# Patient Record
Sex: FEMALE | Race: WHITE | NOT HISPANIC OR LATINO | Employment: UNEMPLOYED | URBAN - METROPOLITAN AREA
[De-identification: names, ages, dates, MRNs, and addresses within clinical notes are randomized per-mention and may not be internally consistent; named-entity substitution may affect disease eponyms.]

---

## 2023-01-01 ENCOUNTER — HOME CARE VISIT (OUTPATIENT)
Dept: HOSPICE | Facility: HOSPICE | Age: 63
End: 2023-01-01
Payer: MEDICARE

## 2023-01-01 ENCOUNTER — PHARMACY VISIT (OUTPATIENT)
Dept: PHARMACY | Facility: MEDICAL CENTER | Age: 63
End: 2023-01-01
Payer: COMMERCIAL

## 2023-01-01 ENCOUNTER — OFFICE VISIT (OUTPATIENT)
Dept: URGENT CARE | Facility: PHYSICIAN GROUP | Age: 63
End: 2023-01-01
Payer: MEDICARE

## 2023-01-01 ENCOUNTER — HOSPITAL ENCOUNTER (INPATIENT)
Facility: MEDICAL CENTER | Age: 63
LOS: 5 days | DRG: 640 | End: 2023-11-29
Attending: EMERGENCY MEDICINE | Admitting: FAMILY MEDICINE
Payer: MEDICARE

## 2023-01-01 ENCOUNTER — APPOINTMENT (OUTPATIENT)
Dept: RADIOLOGY | Facility: MEDICAL CENTER | Age: 63
DRG: 640 | End: 2023-01-01
Attending: EMERGENCY MEDICINE
Payer: MEDICARE

## 2023-01-01 ENCOUNTER — PHARMACY VISIT (OUTPATIENT)
Dept: PHARMACY | Facility: MEDICAL CENTER | Age: 63
End: 2023-01-01

## 2023-01-01 ENCOUNTER — HOSPICE ADMISSION (OUTPATIENT)
Dept: HOSPICE | Facility: HOSPICE | Age: 63
End: 2023-01-01
Payer: MEDICARE

## 2023-01-01 ENCOUNTER — HOSPITAL ENCOUNTER (OUTPATIENT)
Dept: RADIOLOGY | Facility: MEDICAL CENTER | Age: 63
End: 2023-11-20
Attending: NURSE PRACTITIONER
Payer: MEDICARE

## 2023-01-01 VITALS — HEART RATE: 100 BPM | SYSTOLIC BLOOD PRESSURE: 140 MMHG | RESPIRATION RATE: 22 BRPM | DIASTOLIC BLOOD PRESSURE: 84 MMHG

## 2023-01-01 VITALS — RESPIRATION RATE: 18 BRPM | HEART RATE: 80 BPM

## 2023-01-01 VITALS
RESPIRATION RATE: 18 BRPM | TEMPERATURE: 97.5 F | DIASTOLIC BLOOD PRESSURE: 74 MMHG | HEART RATE: 94 BPM | SYSTOLIC BLOOD PRESSURE: 132 MMHG | WEIGHT: 157.85 LBS | HEIGHT: 64 IN | BODY MASS INDEX: 26.95 KG/M2 | OXYGEN SATURATION: 95 %

## 2023-01-01 VITALS
BODY MASS INDEX: 27.66 KG/M2 | TEMPERATURE: 96.8 F | HEIGHT: 64 IN | OXYGEN SATURATION: 95 % | SYSTOLIC BLOOD PRESSURE: 132 MMHG | DIASTOLIC BLOOD PRESSURE: 82 MMHG | RESPIRATION RATE: 13 BRPM | HEART RATE: 94 BPM | WEIGHT: 162 LBS

## 2023-01-01 VITALS
HEIGHT: 63 IN | DIASTOLIC BLOOD PRESSURE: 80 MMHG | SYSTOLIC BLOOD PRESSURE: 134 MMHG | RESPIRATION RATE: 18 BRPM | HEART RATE: 106 BPM | TEMPERATURE: 97.7 F | OXYGEN SATURATION: 94 % | WEIGHT: 162 LBS | BODY MASS INDEX: 28.7 KG/M2

## 2023-01-01 VITALS — HEART RATE: 88 BPM | RESPIRATION RATE: 20 BRPM

## 2023-01-01 VITALS — HEART RATE: 72 BPM | RESPIRATION RATE: 18 BRPM

## 2023-01-01 VITALS — HEART RATE: 74 BPM | RESPIRATION RATE: 16 BRPM

## 2023-01-01 VITALS — RESPIRATION RATE: 18 BRPM | HEART RATE: 78 BPM

## 2023-01-01 VITALS — HEART RATE: 72 BPM | RESPIRATION RATE: 16 BRPM

## 2023-01-01 VITALS — RESPIRATION RATE: 12 BRPM | HEART RATE: 48 BPM

## 2023-01-01 DIAGNOSIS — M25.50 ARTHRALGIA, UNSPECIFIED JOINT: ICD-10-CM

## 2023-01-01 DIAGNOSIS — M51.36 DDD (DEGENERATIVE DISC DISEASE), LUMBAR: ICD-10-CM

## 2023-01-01 DIAGNOSIS — M54.50 ACUTE RIGHT-SIDED LOW BACK PAIN WITHOUT SCIATICA: ICD-10-CM

## 2023-01-01 DIAGNOSIS — G89.3 CANCER ASSOCIATED PAIN: ICD-10-CM

## 2023-01-01 DIAGNOSIS — M25.552 LEFT HIP PAIN: ICD-10-CM

## 2023-01-01 DIAGNOSIS — C79.9 METASTATIC MALIGNANT NEOPLASM, UNSPECIFIED SITE (HCC): ICD-10-CM

## 2023-01-01 DIAGNOSIS — R00.0 TACHYCARDIA: ICD-10-CM

## 2023-01-01 DIAGNOSIS — M54.50 ACUTE BILATERAL LOW BACK PAIN WITHOUT SCIATICA: ICD-10-CM

## 2023-01-01 DIAGNOSIS — C79.9 METASTATIC MALIGNANT NEOPLASM, UNSPECIFIED SITE (HCC): Primary | ICD-10-CM

## 2023-01-01 DIAGNOSIS — E83.52 HYPERCALCEMIA: ICD-10-CM

## 2023-01-01 DIAGNOSIS — Z51.5 HOSPICE CARE: ICD-10-CM

## 2023-01-01 DIAGNOSIS — M54.50 ACUTE LOW BACK PAIN WITHOUT SCIATICA, UNSPECIFIED BACK PAIN LATERALITY: ICD-10-CM

## 2023-01-01 LAB
ALBUMIN SERPL BCP-MCNC: 3.2 G/DL (ref 3.2–4.9)
ALBUMIN SERPL BCP-MCNC: 3.6 G/DL (ref 3.2–4.9)
ALBUMIN SERPL BCP-MCNC: 4.1 G/DL (ref 3.2–4.9)
ALBUMIN SERPL BCP-MCNC: 4.7 G/DL (ref 3.2–4.9)
ALBUMIN/GLOB SERPL: 1.3 G/DL
ALBUMIN/GLOB SERPL: 1.3 G/DL
ALBUMIN/GLOB SERPL: 1.5 G/DL
ALBUMIN/GLOB SERPL: 1.5 G/DL
ALP SERPL-CCNC: 108 U/L (ref 30–99)
ALP SERPL-CCNC: 119 U/L (ref 30–99)
ALP SERPL-CCNC: 131 U/L (ref 30–99)
ALP SERPL-CCNC: 98 U/L (ref 30–99)
ALT SERPL-CCNC: 10 U/L (ref 2–50)
ALT SERPL-CCNC: 10 U/L (ref 2–50)
ALT SERPL-CCNC: 11 U/L (ref 2–50)
ALT SERPL-CCNC: 11 U/L (ref 2–50)
ANION GAP SERPL CALC-SCNC: 10 MMOL/L (ref 7–16)
ANION GAP SERPL CALC-SCNC: 11 MMOL/L (ref 7–16)
ANION GAP SERPL CALC-SCNC: 13 MMOL/L (ref 7–16)
ANION GAP SERPL CALC-SCNC: 6 MMOL/L (ref 7–16)
AST SERPL-CCNC: 15 U/L (ref 12–45)
AST SERPL-CCNC: 17 U/L (ref 12–45)
AST SERPL-CCNC: 17 U/L (ref 12–45)
AST SERPL-CCNC: 18 U/L (ref 12–45)
BACTERIA BLD CULT: NORMAL
BASOPHILS # BLD AUTO: 0.1 % (ref 0–1.8)
BASOPHILS # BLD AUTO: 0.1 % (ref 0–1.8)
BASOPHILS # BLD AUTO: 0.2 % (ref 0–1.8)
BASOPHILS # BLD AUTO: 0.3 % (ref 0–1.8)
BASOPHILS # BLD: 0.01 K/UL (ref 0–0.12)
BASOPHILS # BLD: 0.02 K/UL (ref 0–0.12)
BASOPHILS # BLD: 0.02 K/UL (ref 0–0.12)
BASOPHILS # BLD: 0.04 K/UL (ref 0–0.12)
BILIRUB SERPL-MCNC: 0.3 MG/DL (ref 0.1–1.5)
BUN SERPL-MCNC: 14 MG/DL (ref 8–22)
BUN SERPL-MCNC: 17 MG/DL (ref 8–22)
BUN SERPL-MCNC: 19 MG/DL (ref 8–22)
BUN SERPL-MCNC: 9 MG/DL (ref 8–22)
CA-I SERPL-SCNC: 1.6 MMOL/L (ref 1.1–1.3)
CALCIUM ALBUM COR SERPL-MCNC: 10.3 MG/DL (ref 8.5–10.5)
CALCIUM ALBUM COR SERPL-MCNC: 11.4 MG/DL (ref 8.5–10.5)
CALCIUM ALBUM COR SERPL-MCNC: 12 MG/DL (ref 8.5–10.5)
CALCIUM ALBUM COR SERPL-MCNC: 12.5 MG/DL (ref 8.5–10.5)
CALCIUM SERPL-MCNC: 11.1 MG/DL (ref 8.5–10.5)
CALCIUM SERPL-MCNC: 12.1 MG/DL (ref 8.5–10.5)
CALCIUM SERPL-MCNC: 13.1 MG/DL (ref 8.5–10.5)
CALCIUM SERPL-MCNC: 9.7 MG/DL (ref 8.5–10.5)
CHLORIDE SERPL-SCNC: 100 MMOL/L (ref 96–112)
CHLORIDE SERPL-SCNC: 100 MMOL/L (ref 96–112)
CHLORIDE SERPL-SCNC: 96 MMOL/L (ref 96–112)
CHLORIDE SERPL-SCNC: 97 MMOL/L (ref 96–112)
CO2 SERPL-SCNC: 23 MMOL/L (ref 20–33)
CO2 SERPL-SCNC: 25 MMOL/L (ref 20–33)
CO2 SERPL-SCNC: 27 MMOL/L (ref 20–33)
CO2 SERPL-SCNC: 27 MMOL/L (ref 20–33)
CREAT SERPL-MCNC: 0.66 MG/DL (ref 0.5–1.4)
CREAT SERPL-MCNC: 0.85 MG/DL (ref 0.5–1.4)
CREAT SERPL-MCNC: 0.86 MG/DL (ref 0.5–1.4)
CREAT SERPL-MCNC: 0.92 MG/DL (ref 0.5–1.4)
EKG IMPRESSION: NORMAL
EOSINOPHIL # BLD AUTO: 0 K/UL (ref 0–0.51)
EOSINOPHIL # BLD AUTO: 0.04 K/UL (ref 0–0.51)
EOSINOPHIL # BLD AUTO: 0.17 K/UL (ref 0–0.51)
EOSINOPHIL # BLD AUTO: 0.23 K/UL (ref 0–0.51)
EOSINOPHIL NFR BLD: 0 % (ref 0–6.9)
EOSINOPHIL NFR BLD: 0.3 % (ref 0–6.9)
EOSINOPHIL NFR BLD: 1.5 % (ref 0–6.9)
EOSINOPHIL NFR BLD: 2.5 % (ref 0–6.9)
ERYTHROCYTE [DISTWIDTH] IN BLOOD BY AUTOMATED COUNT: 43.9 FL (ref 35.9–50)
ERYTHROCYTE [DISTWIDTH] IN BLOOD BY AUTOMATED COUNT: 44.6 FL (ref 35.9–50)
ERYTHROCYTE [DISTWIDTH] IN BLOOD BY AUTOMATED COUNT: 45.2 FL (ref 35.9–50)
ERYTHROCYTE [DISTWIDTH] IN BLOOD BY AUTOMATED COUNT: 46 FL (ref 35.9–50)
EST. AVERAGE GLUCOSE BLD GHB EST-MCNC: 131 MG/DL
GFR SERPLBLD CREATININE-BSD FMLA CKD-EPI: 69 ML/MIN/1.73 M 2
GFR SERPLBLD CREATININE-BSD FMLA CKD-EPI: 75 ML/MIN/1.73 M 2
GFR SERPLBLD CREATININE-BSD FMLA CKD-EPI: 76 ML/MIN/1.73 M 2
GFR SERPLBLD CREATININE-BSD FMLA CKD-EPI: 97 ML/MIN/1.73 M 2
GLOBULIN SER CALC-MCNC: 2.5 G/DL (ref 1.9–3.5)
GLOBULIN SER CALC-MCNC: 2.7 G/DL (ref 1.9–3.5)
GLOBULIN SER CALC-MCNC: 2.8 G/DL (ref 1.9–3.5)
GLOBULIN SER CALC-MCNC: 3.2 G/DL (ref 1.9–3.5)
GLUCOSE SERPL-MCNC: 112 MG/DL (ref 65–99)
GLUCOSE SERPL-MCNC: 119 MG/DL (ref 65–99)
GLUCOSE SERPL-MCNC: 125 MG/DL (ref 65–99)
GLUCOSE SERPL-MCNC: 126 MG/DL (ref 65–99)
HBA1C MFR BLD: 6.2 % (ref 4–5.6)
HCT VFR BLD AUTO: 37.6 % (ref 37–47)
HCT VFR BLD AUTO: 40.2 % (ref 37–47)
HCT VFR BLD AUTO: 43.7 % (ref 37–47)
HCT VFR BLD AUTO: 44.5 % (ref 37–47)
HGB BLD-MCNC: 12.3 G/DL (ref 12–16)
HGB BLD-MCNC: 12.8 G/DL (ref 12–16)
HGB BLD-MCNC: 14.6 G/DL (ref 12–16)
HGB BLD-MCNC: 14.8 G/DL (ref 12–16)
IMM GRANULOCYTES # BLD AUTO: 0.08 K/UL (ref 0–0.11)
IMM GRANULOCYTES # BLD AUTO: 0.1 K/UL (ref 0–0.11)
IMM GRANULOCYTES # BLD AUTO: 0.14 K/UL (ref 0–0.11)
IMM GRANULOCYTES # BLD AUTO: 0.17 K/UL (ref 0–0.11)
IMM GRANULOCYTES NFR BLD AUTO: 0.9 % (ref 0–0.9)
IMM GRANULOCYTES NFR BLD AUTO: 1.1 % (ref 0–0.9)
LACTATE SERPL-SCNC: 2 MMOL/L (ref 0.5–2)
LACTATE SERPL-SCNC: 3.3 MMOL/L (ref 0.5–2)
LYMPHOCYTES # BLD AUTO: 2.12 K/UL (ref 1–4.8)
LYMPHOCYTES # BLD AUTO: 2.17 K/UL (ref 1–4.8)
LYMPHOCYTES # BLD AUTO: 3.01 K/UL (ref 1–4.8)
LYMPHOCYTES # BLD AUTO: 4.32 K/UL (ref 1–4.8)
LYMPHOCYTES NFR BLD: 13.6 % (ref 22–41)
LYMPHOCYTES NFR BLD: 22.9 % (ref 22–41)
LYMPHOCYTES NFR BLD: 26.8 % (ref 22–41)
LYMPHOCYTES NFR BLD: 28.1 % (ref 22–41)
MAGNESIUM SERPL-MCNC: 1.8 MG/DL (ref 1.5–2.5)
MCH RBC QN AUTO: 28.7 PG (ref 27–33)
MCH RBC QN AUTO: 29.1 PG (ref 27–33)
MCH RBC QN AUTO: 29.3 PG (ref 27–33)
MCH RBC QN AUTO: 29.4 PG (ref 27–33)
MCHC RBC AUTO-ENTMCNC: 31.8 G/DL (ref 32.2–35.5)
MCHC RBC AUTO-ENTMCNC: 32.7 G/DL (ref 32.2–35.5)
MCHC RBC AUTO-ENTMCNC: 32.8 G/DL (ref 32.2–35.5)
MCHC RBC AUTO-ENTMCNC: 33.9 G/DL (ref 32.2–35.5)
MCV RBC AUTO: 86.7 FL (ref 81.4–97.8)
MCV RBC AUTO: 88.8 FL (ref 81.4–97.8)
MCV RBC AUTO: 89.5 FL (ref 81.4–97.8)
MCV RBC AUTO: 90.1 FL (ref 81.4–97.8)
MONOCYTES # BLD AUTO: 0.76 K/UL (ref 0–0.85)
MONOCYTES # BLD AUTO: 0.97 K/UL (ref 0–0.85)
MONOCYTES # BLD AUTO: 1.05 K/UL (ref 0–0.85)
MONOCYTES # BLD AUTO: 1.26 K/UL (ref 0–0.85)
MONOCYTES NFR BLD AUTO: 6.6 % (ref 0–13.4)
MONOCYTES NFR BLD AUTO: 8.2 % (ref 0–13.4)
MONOCYTES NFR BLD AUTO: 8.2 % (ref 0–13.4)
MONOCYTES NFR BLD AUTO: 8.6 % (ref 0–13.4)
NEUTROPHILS # BLD AUTO: 12.54 K/UL (ref 1.82–7.42)
NEUTROPHILS # BLD AUTO: 6.04 K/UL (ref 1.82–7.42)
NEUTROPHILS # BLD AUTO: 6.98 K/UL (ref 1.82–7.42)
NEUTROPHILS # BLD AUTO: 9.58 K/UL (ref 1.82–7.42)
NEUTROPHILS NFR BLD: 62 % (ref 44–72)
NEUTROPHILS NFR BLD: 62.2 % (ref 44–72)
NEUTROPHILS NFR BLD: 65.4 % (ref 44–72)
NEUTROPHILS NFR BLD: 78.6 % (ref 44–72)
NRBC # BLD AUTO: 0 K/UL
NRBC BLD-RTO: 0 /100 WBC (ref 0–0.2)
PHOSPHATE SERPL-MCNC: 2.6 MG/DL (ref 2.5–4.5)
PLATELET # BLD AUTO: 211 K/UL (ref 164–446)
PLATELET # BLD AUTO: 235 K/UL (ref 164–446)
PLATELET # BLD AUTO: 275 K/UL (ref 164–446)
PLATELET # BLD AUTO: 354 K/UL (ref 164–446)
PMV BLD AUTO: 10.3 FL (ref 9–12.9)
PMV BLD AUTO: 9.6 FL (ref 9–12.9)
PMV BLD AUTO: 9.7 FL (ref 9–12.9)
PMV BLD AUTO: 9.8 FL (ref 9–12.9)
POTASSIUM SERPL-SCNC: 3.7 MMOL/L (ref 3.6–5.5)
POTASSIUM SERPL-SCNC: 3.9 MMOL/L (ref 3.6–5.5)
POTASSIUM SERPL-SCNC: 4.2 MMOL/L (ref 3.6–5.5)
POTASSIUM SERPL-SCNC: 4.6 MMOL/L (ref 3.6–5.5)
PROT SERPL-MCNC: 5.7 G/DL (ref 6–8.2)
PROT SERPL-MCNC: 6.3 G/DL (ref 6–8.2)
PROT SERPL-MCNC: 6.9 G/DL (ref 6–8.2)
PROT SERPL-MCNC: 7.9 G/DL (ref 6–8.2)
PTH-INTACT SERPL-MCNC: 8 PG/ML (ref 14–72)
RBC # BLD AUTO: 4.2 M/UL (ref 4.2–5.4)
RBC # BLD AUTO: 4.46 M/UL (ref 4.2–5.4)
RBC # BLD AUTO: 5.01 M/UL (ref 4.2–5.4)
RBC # BLD AUTO: 5.04 M/UL (ref 4.2–5.4)
SIGNIFICANT IND 70042: NORMAL
SITE SITE: NORMAL
SODIUM SERPL-SCNC: 132 MMOL/L (ref 135–145)
SODIUM SERPL-SCNC: 133 MMOL/L (ref 135–145)
SODIUM SERPL-SCNC: 134 MMOL/L (ref 135–145)
SODIUM SERPL-SCNC: 136 MMOL/L (ref 135–145)
SOURCE SOURCE: NORMAL
TROPONIN T SERPL-MCNC: 9 NG/L (ref 6–19)
WBC # BLD AUTO: 11.3 K/UL (ref 4.8–10.8)
WBC # BLD AUTO: 15.4 K/UL (ref 4.8–10.8)
WBC # BLD AUTO: 16 K/UL (ref 4.8–10.8)
WBC # BLD AUTO: 9.2 K/UL (ref 4.8–10.8)

## 2023-01-01 PROCEDURE — 99232 SBSQ HOSP IP/OBS MODERATE 35: CPT | Mod: GC | Performed by: FAMILY MEDICINE

## 2023-01-01 PROCEDURE — 99232 SBSQ HOSP IP/OBS MODERATE 35: CPT | Performed by: NURSE PRACTITIONER

## 2023-01-01 PROCEDURE — 80053 COMPREHEN METABOLIC PANEL: CPT

## 2023-01-01 PROCEDURE — 85025 COMPLETE CBC W/AUTO DIFF WBC: CPT

## 2023-01-01 PROCEDURE — 36415 COLL VENOUS BLD VENIPUNCTURE: CPT

## 2023-01-01 PROCEDURE — 700111 HCHG RX REV CODE 636 W/ 250 OVERRIDE (IP): Performed by: STUDENT IN AN ORGANIZED HEALTH CARE EDUCATION/TRAINING PROGRAM

## 2023-01-01 PROCEDURE — A9270 NON-COVERED ITEM OR SERVICE: HCPCS | Performed by: STUDENT IN AN ORGANIZED HEALTH CARE EDUCATION/TRAINING PROGRAM

## 2023-01-01 PROCEDURE — 700105 HCHG RX REV CODE 258: Performed by: STUDENT IN AN ORGANIZED HEALTH CARE EDUCATION/TRAINING PROGRAM

## 2023-01-01 PROCEDURE — S9126 HOSPICE CARE, IN THE HOME, P: HCPCS

## 2023-01-01 PROCEDURE — 97535 SELF CARE MNGMENT TRAINING: CPT

## 2023-01-01 PROCEDURE — 82330 ASSAY OF CALCIUM: CPT

## 2023-01-01 PROCEDURE — 99233 SBSQ HOSP IP/OBS HIGH 50: CPT | Performed by: NURSE PRACTITIONER

## 2023-01-01 PROCEDURE — 3079F DIAST BP 80-89 MM HG: CPT | Performed by: NURSE PRACTITIONER

## 2023-01-01 PROCEDURE — 700102 HCHG RX REV CODE 250 W/ 637 OVERRIDE(OP): Performed by: STUDENT IN AN ORGANIZED HEALTH CARE EDUCATION/TRAINING PROGRAM

## 2023-01-01 PROCEDURE — 700102 HCHG RX REV CODE 250 W/ 637 OVERRIDE(OP): Performed by: NURSE PRACTITIONER

## 2023-01-01 PROCEDURE — 83735 ASSAY OF MAGNESIUM: CPT

## 2023-01-01 PROCEDURE — G0316 PR PROLONGED IP/OBS E&M EA 15 MIN: HCPCS | Performed by: NURSE PRACTITIONER

## 2023-01-01 PROCEDURE — 700102 HCHG RX REV CODE 250 W/ 637 OVERRIDE(OP)

## 2023-01-01 PROCEDURE — RXMED WILLOW AMBULATORY MEDICATION CHARGE: Performed by: STUDENT IN AN ORGANIZED HEALTH CARE EDUCATION/TRAINING PROGRAM

## 2023-01-01 PROCEDURE — G0299 HHS/HOSPICE OF RN EA 15 MIN: HCPCS

## 2023-01-01 PROCEDURE — 700111 HCHG RX REV CODE 636 W/ 250 OVERRIDE (IP): Mod: JZ | Performed by: NURSE PRACTITIONER

## 2023-01-01 PROCEDURE — 87040 BLOOD CULTURE FOR BACTERIA: CPT

## 2023-01-01 PROCEDURE — 72128 CT CHEST SPINE W/O DYE: CPT

## 2023-01-01 PROCEDURE — A9270 NON-COVERED ITEM OR SERVICE: HCPCS | Performed by: NURSE PRACTITIONER

## 2023-01-01 PROCEDURE — 97165 OT EVAL LOW COMPLEX 30 MIN: CPT

## 2023-01-01 PROCEDURE — 99285 EMERGENCY DEPT VISIT HI MDM: CPT

## 2023-01-01 PROCEDURE — 84100 ASSAY OF PHOSPHORUS: CPT

## 2023-01-01 PROCEDURE — 94669 MECHANICAL CHEST WALL OSCILL: CPT

## 2023-01-01 PROCEDURE — 97530 THERAPEUTIC ACTIVITIES: CPT

## 2023-01-01 PROCEDURE — 71045 X-RAY EXAM CHEST 1 VIEW: CPT

## 2023-01-01 PROCEDURE — 83970 ASSAY OF PARATHORMONE: CPT

## 2023-01-01 PROCEDURE — 72100 X-RAY EXAM L-S SPINE 2/3 VWS: CPT

## 2023-01-01 PROCEDURE — 94760 N-INVAS EAR/PLS OXIMETRY 1: CPT

## 2023-01-01 PROCEDURE — 71260 CT THORAX DX C+: CPT

## 2023-01-01 PROCEDURE — 99223 1ST HOSP IP/OBS HIGH 75: CPT | Mod: AI,GC | Performed by: FAMILY MEDICINE

## 2023-01-01 PROCEDURE — G0155 HHCP-SVS OF CSW,EA 15 MIN: HCPCS

## 2023-01-01 PROCEDURE — 99223 1ST HOSP IP/OBS HIGH 75: CPT | Mod: 25 | Performed by: NURSE PRACTITIONER

## 2023-01-01 PROCEDURE — 770020 HCHG ROOM/CARE - TELE (206)

## 2023-01-01 PROCEDURE — 700111 HCHG RX REV CODE 636 W/ 250 OVERRIDE (IP): Mod: JZ | Performed by: EMERGENCY MEDICINE

## 2023-01-01 PROCEDURE — 700111 HCHG RX REV CODE 636 W/ 250 OVERRIDE (IP): Mod: JZ | Performed by: STUDENT IN AN ORGANIZED HEALTH CARE EDUCATION/TRAINING PROGRAM

## 2023-01-01 PROCEDURE — 3075F SYST BP GE 130 - 139MM HG: CPT | Performed by: NURSE PRACTITIONER

## 2023-01-01 PROCEDURE — A9270 NON-COVERED ITEM OR SERVICE: HCPCS

## 2023-01-01 PROCEDURE — 83036 HEMOGLOBIN GLYCOSYLATED A1C: CPT

## 2023-01-01 PROCEDURE — 665036 HSPC NOTICE OF ELECTION NOE

## 2023-01-01 PROCEDURE — 73502 X-RAY EXAM HIP UNI 2-3 VIEWS: CPT | Mod: LT

## 2023-01-01 PROCEDURE — 72131 CT LUMBAR SPINE W/O DYE: CPT

## 2023-01-01 PROCEDURE — 83605 ASSAY OF LACTIC ACID: CPT

## 2023-01-01 PROCEDURE — RXMED WILLOW AMBULATORY MEDICATION CHARGE: Performed by: REHABILITATION PRACTITIONER

## 2023-01-01 PROCEDURE — 99203 OFFICE O/P NEW LOW 30 MIN: CPT | Performed by: NURSE PRACTITIONER

## 2023-01-01 PROCEDURE — 93005 ELECTROCARDIOGRAM TRACING: CPT | Performed by: EMERGENCY MEDICINE

## 2023-01-01 PROCEDURE — 99497 ADVNCD CARE PLAN 30 MIN: CPT | Performed by: NURSE PRACTITIONER

## 2023-01-01 PROCEDURE — 99238 HOSP IP/OBS DSCHRG MGMT 30/<: CPT | Mod: GC | Performed by: FAMILY MEDICINE

## 2023-01-01 PROCEDURE — G0156 HHCP-SVS OF AIDE,EA 15 MIN: HCPCS

## 2023-01-01 PROCEDURE — 96374 THER/PROPH/DIAG INJ IV PUSH: CPT

## 2023-01-01 PROCEDURE — 99214 OFFICE O/P EST MOD 30 MIN: CPT | Performed by: NURSE PRACTITIONER

## 2023-01-01 PROCEDURE — 96375 TX/PRO/DX INJ NEW DRUG ADDON: CPT

## 2023-01-01 PROCEDURE — 97163 PT EVAL HIGH COMPLEX 45 MIN: CPT

## 2023-01-01 PROCEDURE — 700117 HCHG RX CONTRAST REV CODE 255: Performed by: EMERGENCY MEDICINE

## 2023-01-01 PROCEDURE — 700105 HCHG RX REV CODE 258: Performed by: EMERGENCY MEDICINE

## 2023-01-01 PROCEDURE — 84484 ASSAY OF TROPONIN QUANT: CPT

## 2023-01-01 RX ORDER — POLYETHYLENE GLYCOL 3350 17 G/17G
1 POWDER, FOR SOLUTION ORAL
Status: DISCONTINUED | OUTPATIENT
Start: 2023-01-01 | End: 2023-01-01

## 2023-01-01 RX ORDER — GUAIFENESIN DEXTROMETHORPHAN HYDROBROMIDE ORAL SOLUTION 10; 100 MG/5ML; MG/5ML
10 SOLUTION ORAL EVERY 6 HOURS PRN
Status: DISCONTINUED | OUTPATIENT
Start: 2023-01-01 | End: 2023-01-01 | Stop reason: HOSPADM

## 2023-01-01 RX ORDER — MORPHINE SULFATE 100 MG/5ML
10-20 SOLUTION ORAL
Qty: 240 ML | Refills: 0 | Status: SHIPPED | OUTPATIENT
Start: 2023-01-01 | End: 2024-01-01 | Stop reason: SDUPTHER

## 2023-01-01 RX ORDER — OXYCODONE HYDROCHLORIDE 5 MG/1
5 TABLET ORAL EVERY 4 HOURS PRN
Status: DISCONTINUED | OUTPATIENT
Start: 2023-01-01 | End: 2023-01-01

## 2023-01-01 RX ORDER — NICOTINE 21 MG/24HR
1 PATCH, TRANSDERMAL 24 HOURS TRANSDERMAL EVERY 24 HOURS
Qty: 28 PATCH | Refills: 3 | Status: SHIPPED | OUTPATIENT
Start: 2023-01-01 | End: 2023-01-01

## 2023-01-01 RX ORDER — ONDANSETRON 2 MG/ML
4 INJECTION INTRAMUSCULAR; INTRAVENOUS ONCE
Status: COMPLETED | OUTPATIENT
Start: 2023-01-01 | End: 2023-01-01

## 2023-01-01 RX ORDER — MORPHINE SULFATE 30 MG/1
30 TABLET, FILM COATED, EXTENDED RELEASE ORAL EVERY 12 HOURS
Qty: 120 TABLET | Refills: 0 | Status: SHIPPED | OUTPATIENT
Start: 2023-01-01 | End: 2023-01-01

## 2023-01-01 RX ORDER — DEXAMETHASONE SODIUM PHOSPHATE 4 MG/ML
4 INJECTION, SOLUTION INTRA-ARTICULAR; INTRALESIONAL; INTRAMUSCULAR; INTRAVENOUS; SOFT TISSUE 2 TIMES DAILY
Status: DISCONTINUED | OUTPATIENT
Start: 2023-01-01 | End: 2023-01-01 | Stop reason: HOSPADM

## 2023-01-01 RX ORDER — DEXAMETHASONE 4 MG/1
4 TABLET ORAL 2 TIMES DAILY
Qty: 60 TABLET | Refills: 3 | Status: SHIPPED | OUTPATIENT
Start: 2023-01-01 | End: 2024-11-28

## 2023-01-01 RX ORDER — CYCLOBENZAPRINE HCL 10 MG
10 TABLET ORAL 3 TIMES DAILY PRN
Status: DISCONTINUED | OUTPATIENT
Start: 2023-01-01 | End: 2023-01-01

## 2023-01-01 RX ORDER — OMEPRAZOLE 20 MG/1
20 CAPSULE, DELAYED RELEASE ORAL DAILY
Status: DISCONTINUED | OUTPATIENT
Start: 2023-01-01 | End: 2023-01-01 | Stop reason: HOSPADM

## 2023-01-01 RX ORDER — MORPHINE SULFATE 15 MG/1
15 TABLET ORAL EVERY 4 HOURS PRN
Status: DISCONTINUED | OUTPATIENT
Start: 2023-01-01 | End: 2023-01-01 | Stop reason: HOSPADM

## 2023-01-01 RX ORDER — CALCIUM CARBONATE 500 MG/1
500 TABLET, CHEWABLE ORAL 2 TIMES DAILY PRN
Status: DISCONTINUED | OUTPATIENT
Start: 2023-01-01 | End: 2023-01-01 | Stop reason: HOSPADM

## 2023-01-01 RX ORDER — ONDANSETRON 2 MG/ML
4 INJECTION INTRAMUSCULAR; INTRAVENOUS EVERY 4 HOURS PRN
Status: DISCONTINUED | OUTPATIENT
Start: 2023-01-01 | End: 2023-01-01 | Stop reason: HOSPADM

## 2023-01-01 RX ORDER — ONDANSETRON 4 MG/1
4 TABLET, ORALLY DISINTEGRATING ORAL EVERY 6 HOURS PRN
Qty: 15 TABLET | Refills: 10 | Status: SHIPPED | OUTPATIENT
Start: 2023-01-01 | End: 2024-11-28

## 2023-01-01 RX ORDER — PREDNISONE 20 MG/1
TABLET ORAL
Qty: 15 TABLET | Refills: 0 | Status: ON HOLD | OUTPATIENT
Start: 2023-01-01 | End: 2023-01-01

## 2023-01-01 RX ORDER — HYDROMORPHONE HYDROCHLORIDE 1 MG/ML
1 INJECTION, SOLUTION INTRAMUSCULAR; INTRAVENOUS; SUBCUTANEOUS
Status: DISCONTINUED | OUTPATIENT
Start: 2023-01-01 | End: 2023-01-01 | Stop reason: HOSPADM

## 2023-01-01 RX ORDER — BACLOFEN 5 MG/1
5 TABLET ORAL 2 TIMES DAILY
Qty: 60 TABLET | Refills: 3 | Status: SHIPPED | OUTPATIENT
Start: 2023-01-01 | End: 2024-11-28

## 2023-01-01 RX ORDER — ONDANSETRON 4 MG/1
4 TABLET, ORALLY DISINTEGRATING ORAL EVERY 4 HOURS PRN
Status: DISCONTINUED | OUTPATIENT
Start: 2023-01-01 | End: 2023-01-01 | Stop reason: HOSPADM

## 2023-01-01 RX ORDER — ACETAMINOPHEN 650 MG/1
650 SUPPOSITORY RECTAL EVERY 6 HOURS PRN
Qty: 5 SUPPOSITORY | Refills: 10 | Status: SHIPPED | OUTPATIENT
Start: 2023-01-01

## 2023-01-01 RX ORDER — NICOTINE 21 MG/24HR
14 PATCH, TRANSDERMAL 24 HOURS TRANSDERMAL
Status: DISCONTINUED | OUTPATIENT
Start: 2023-01-01 | End: 2023-01-01 | Stop reason: HOSPADM

## 2023-01-01 RX ORDER — OXYCODONE HYDROCHLORIDE 10 MG/1
10 TABLET ORAL EVERY 4 HOURS PRN
Status: DISCONTINUED | OUTPATIENT
Start: 2023-01-01 | End: 2023-01-01

## 2023-01-01 RX ORDER — GABAPENTIN 100 MG/1
100 CAPSULE ORAL NIGHTLY
Status: DISCONTINUED | OUTPATIENT
Start: 2023-01-01 | End: 2023-01-01 | Stop reason: HOSPADM

## 2023-01-01 RX ORDER — BISACODYL 10 MG
10 SUPPOSITORY, RECTAL RECTAL
Status: DISCONTINUED | OUTPATIENT
Start: 2023-01-01 | End: 2023-01-01 | Stop reason: HOSPADM

## 2023-01-01 RX ORDER — BENZONATATE 100 MG/1
100 CAPSULE ORAL 3 TIMES DAILY PRN
Status: DISCONTINUED | OUTPATIENT
Start: 2023-01-01 | End: 2023-01-01 | Stop reason: HOSPADM

## 2023-01-01 RX ORDER — SODIUM CHLORIDE 9 MG/ML
INJECTION, SOLUTION INTRAVENOUS CONTINUOUS
Status: DISCONTINUED | OUTPATIENT
Start: 2023-01-01 | End: 2023-01-01

## 2023-01-01 RX ORDER — PHENAZOPYRIDINE HYDROCHLORIDE 200 MG/1
200 TABLET, FILM COATED ORAL 3 TIMES DAILY PRN
Qty: 20 TABLET | Refills: 11 | Status: SHIPPED | OUTPATIENT
Start: 2023-01-01 | End: 2024-12-13

## 2023-01-01 RX ORDER — HYDROCODONE BITARTRATE AND ACETAMINOPHEN 5; 325 MG/1; MG/1
1 TABLET ORAL EVERY 8 HOURS PRN
Qty: 15 TABLET | Refills: 0 | Status: ON HOLD | OUTPATIENT
Start: 2023-01-01 | End: 2023-01-01

## 2023-01-01 RX ORDER — LACTULOSE 20 G/30ML
30 SOLUTION ORAL ONCE
Status: COMPLETED | OUTPATIENT
Start: 2023-01-01 | End: 2023-01-01

## 2023-01-01 RX ORDER — SODIUM CHLORIDE 9 MG/ML
1000 INJECTION, SOLUTION INTRAVENOUS ONCE
Status: COMPLETED | OUTPATIENT
Start: 2023-01-01 | End: 2023-01-01

## 2023-01-01 RX ORDER — AMOXICILLIN 250 MG
2 CAPSULE ORAL 2 TIMES DAILY
Status: DISCONTINUED | OUTPATIENT
Start: 2023-01-01 | End: 2023-01-01 | Stop reason: HOSPADM

## 2023-01-01 RX ORDER — ACETAMINOPHEN 325 MG/1
650 TABLET ORAL EVERY 6 HOURS PRN
Status: DISCONTINUED | OUTPATIENT
Start: 2023-01-01 | End: 2023-01-01 | Stop reason: HOSPADM

## 2023-01-01 RX ORDER — MORPHINE SULFATE 60 MG/1
60 TABLET, FILM COATED, EXTENDED RELEASE ORAL EVERY 12 HOURS
Qty: 120 TABLET | Refills: 0 | Status: SHIPPED | OUTPATIENT
Start: 2023-01-01 | End: 2024-01-01 | Stop reason: SDUPTHER

## 2023-01-01 RX ORDER — SENNA AND DOCUSATE SODIUM 50; 8.6 MG/1; MG/1
2 TABLET, FILM COATED ORAL 2 TIMES DAILY PRN
Qty: 28 TABLET | Refills: 10 | Status: SHIPPED | OUTPATIENT
Start: 2023-01-01 | End: 2024-11-28

## 2023-01-01 RX ORDER — NAPROXEN 500 MG/1
500 TABLET ORAL 2 TIMES DAILY PRN
Qty: 30 TABLET | Refills: 0 | Status: ON HOLD | OUTPATIENT
Start: 2023-01-01 | End: 2023-01-01

## 2023-01-01 RX ORDER — ENOXAPARIN SODIUM 100 MG/ML
40 INJECTION SUBCUTANEOUS DAILY
Status: DISCONTINUED | OUTPATIENT
Start: 2023-01-01 | End: 2023-01-01 | Stop reason: HOSPADM

## 2023-01-01 RX ORDER — CYCLOBENZAPRINE HCL 5 MG
5-10 TABLET ORAL 3 TIMES DAILY PRN
Qty: 30 TABLET | Refills: 0 | Status: SHIPPED | OUTPATIENT
Start: 2023-01-01 | End: 2023-01-01

## 2023-01-01 RX ORDER — ACETAMINOPHEN 500 MG
1000 TABLET ORAL EVERY 6 HOURS PRN
Qty: 30 TABLET | Refills: 10 | Status: SHIPPED | OUTPATIENT
Start: 2023-01-01 | End: 2024-11-28

## 2023-01-01 RX ORDER — GUAIFENESIN AND DEXTROMETHORPHAN HYDROBROMIDE 1200; 60 MG/1; MG/1
1 TABLET, EXTENDED RELEASE ORAL EVERY 12 HOURS PRN
Qty: 28 TABLET | Refills: 3 | Status: SHIPPED | OUTPATIENT
Start: 2023-01-01 | End: 2024-12-04

## 2023-01-01 RX ORDER — LORAZEPAM 2 MG/ML
1-2 CONCENTRATE ORAL
Qty: 360 ML | Refills: 0 | Status: SHIPPED | OUTPATIENT
Start: 2023-01-01 | End: 2024-11-28

## 2023-01-01 RX ORDER — AMOXICILLIN 250 MG
2 CAPSULE ORAL 2 TIMES DAILY
Status: DISCONTINUED | OUTPATIENT
Start: 2023-01-01 | End: 2023-01-01

## 2023-01-01 RX ORDER — OMEPRAZOLE 20 MG/1
20 CAPSULE, DELAYED RELEASE ORAL DAILY
Qty: 30 CAPSULE | Refills: 3 | Status: SHIPPED | OUTPATIENT
Start: 2023-01-01 | End: 2024-11-28

## 2023-01-01 RX ORDER — KETOROLAC TROMETHAMINE 30 MG/ML
30 INJECTION, SOLUTION INTRAMUSCULAR; INTRAVENOUS ONCE
Status: COMPLETED | OUTPATIENT
Start: 2023-01-01 | End: 2023-01-01

## 2023-01-01 RX ORDER — SODIUM CHLORIDE, SODIUM LACTATE, POTASSIUM CHLORIDE, AND CALCIUM CHLORIDE .6; .31; .03; .02 G/100ML; G/100ML; G/100ML; G/100ML
30 INJECTION, SOLUTION INTRAVENOUS ONCE
Status: COMPLETED | OUTPATIENT
Start: 2023-01-01 | End: 2023-01-01

## 2023-01-01 RX ORDER — GABAPENTIN 100 MG/1
100 CAPSULE ORAL NIGHTLY
Qty: 30 CAPSULE | Refills: 3 | Status: SHIPPED | OUTPATIENT
Start: 2023-01-01 | End: 2024-11-28

## 2023-01-01 RX ORDER — NICOTINE 21 MG/24HR
14 PATCH, TRANSDERMAL 24 HOURS TRANSDERMAL
Status: DISCONTINUED | OUTPATIENT
Start: 2023-01-01 | End: 2023-01-01

## 2023-01-01 RX ORDER — FAMOTIDINE 20 MG/1
20 TABLET, FILM COATED ORAL 2 TIMES DAILY PRN
Status: DISCONTINUED | OUTPATIENT
Start: 2023-01-01 | End: 2023-01-01 | Stop reason: HOSPADM

## 2023-01-01 RX ORDER — POLYETHYLENE GLYCOL 3350 17 G/17G
1 POWDER, FOR SOLUTION ORAL DAILY
Status: DISCONTINUED | OUTPATIENT
Start: 2023-01-01 | End: 2023-01-01 | Stop reason: HOSPADM

## 2023-01-01 RX ORDER — BACLOFEN 10 MG/1
5 TABLET ORAL 2 TIMES DAILY
Status: DISCONTINUED | OUTPATIENT
Start: 2023-01-01 | End: 2023-01-01 | Stop reason: HOSPADM

## 2023-01-01 RX ORDER — CYCLOBENZAPRINE HCL 5 MG
5-10 TABLET ORAL EVERY 8 HOURS PRN
Qty: 30 TABLET | Refills: 0 | Status: SHIPPED | OUTPATIENT
Start: 2023-01-01 | End: 2023-01-01

## 2023-01-01 RX ORDER — MORPHINE SULFATE 4 MG/ML
4 INJECTION INTRAVENOUS ONCE
Status: COMPLETED | OUTPATIENT
Start: 2023-01-01 | End: 2023-01-01

## 2023-01-01 RX ORDER — MORPHINE SULFATE 15 MG/1
15 TABLET, FILM COATED, EXTENDED RELEASE ORAL EVERY 12 HOURS
Status: DISCONTINUED | OUTPATIENT
Start: 2023-01-01 | End: 2023-01-01 | Stop reason: HOSPADM

## 2023-01-01 RX ORDER — MORPHINE SULFATE 15 MG/1
7.5 TABLET ORAL EVERY 4 HOURS PRN
Status: DISCONTINUED | OUTPATIENT
Start: 2023-01-01 | End: 2023-01-01 | Stop reason: HOSPADM

## 2023-01-01 RX ORDER — POLYETHYLENE GLYCOL 3350 17 G/17G
17 POWDER, FOR SOLUTION ORAL 2 TIMES DAILY PRN
Qty: 238 G | Refills: 6 | Status: SHIPPED | OUTPATIENT
Start: 2023-01-01 | End: 2024-12-04

## 2023-01-01 RX ORDER — BISACODYL 10 MG
10 SUPPOSITORY, RECTAL RECTAL PRN
Qty: 5 SUPPOSITORY | Refills: 10 | Status: SHIPPED | OUTPATIENT
Start: 2023-01-01 | End: 2024-11-28

## 2023-01-01 RX ORDER — LABETALOL HYDROCHLORIDE 5 MG/ML
10 INJECTION, SOLUTION INTRAVENOUS EVERY 4 HOURS PRN
Status: DISCONTINUED | OUTPATIENT
Start: 2023-01-01 | End: 2023-01-01 | Stop reason: HOSPADM

## 2023-01-01 RX ORDER — BISACODYL 10 MG
10 SUPPOSITORY, RECTAL RECTAL
Status: DISCONTINUED | OUTPATIENT
Start: 2023-01-01 | End: 2023-01-01

## 2023-01-01 RX ADMIN — SODIUM CHLORIDE 1000 ML: 9 INJECTION, SOLUTION INTRAVENOUS at 05:07

## 2023-01-01 RX ADMIN — MORPHINE SULFATE 15 MG: 15 TABLET ORAL at 08:49

## 2023-01-01 RX ADMIN — FAMOTIDINE 20 MG: 20 TABLET ORAL at 08:49

## 2023-01-01 RX ADMIN — FAMOTIDINE 20 MG: 20 TABLET ORAL at 09:29

## 2023-01-01 RX ADMIN — SODIUM CHLORIDE: 9 INJECTION, SOLUTION INTRAVENOUS at 02:48

## 2023-01-01 RX ADMIN — SODIUM CHLORIDE: 9 INJECTION, SOLUTION INTRAVENOUS at 20:53

## 2023-01-01 RX ADMIN — DEXAMETHASONE SODIUM PHOSPHATE 4 MG: 4 INJECTION INTRA-ARTICULAR; INTRALESIONAL; INTRAMUSCULAR; INTRAVENOUS; SOFT TISSUE at 12:44

## 2023-01-01 RX ADMIN — MORPHINE SULFATE 15 MG: 15 TABLET, FILM COATED, EXTENDED RELEASE ORAL at 17:26

## 2023-01-01 RX ADMIN — KETOROLAC TROMETHAMINE 30 MG: 30 INJECTION, SOLUTION INTRAMUSCULAR; INTRAVENOUS at 12:03

## 2023-01-01 RX ADMIN — NICOTINE 14 MG: 14 PATCH TRANSDERMAL at 07:39

## 2023-01-01 RX ADMIN — SODIUM CHLORIDE: 9 INJECTION, SOLUTION INTRAVENOUS at 04:04

## 2023-01-01 RX ADMIN — FAMOTIDINE 20 MG: 20 TABLET ORAL at 04:22

## 2023-01-01 RX ADMIN — OXYCODONE HYDROCHLORIDE 10 MG: 10 TABLET ORAL at 11:34

## 2023-01-01 RX ADMIN — MORPHINE SULFATE 7.5 MG: 15 TABLET ORAL at 12:43

## 2023-01-01 RX ADMIN — OXYCODONE HYDROCHLORIDE 10 MG: 10 TABLET ORAL at 09:44

## 2023-01-01 RX ADMIN — HYDROMORPHONE HYDROCHLORIDE 1 MG: 1 INJECTION, SOLUTION INTRAMUSCULAR; INTRAVENOUS; SUBCUTANEOUS at 15:35

## 2023-01-01 RX ADMIN — SODIUM CHLORIDE: 9 INJECTION, SOLUTION INTRAVENOUS at 03:41

## 2023-01-01 RX ADMIN — DOCUSATE SODIUM 50 MG AND SENNOSIDES 8.6 MG 2 TABLET: 8.6; 5 TABLET, FILM COATED ORAL at 06:08

## 2023-01-01 RX ADMIN — DOCUSATE SODIUM 50 MG AND SENNOSIDES 8.6 MG 2 TABLET: 8.6; 5 TABLET, FILM COATED ORAL at 05:08

## 2023-01-01 RX ADMIN — OXYCODONE HYDROCHLORIDE 10 MG: 10 TABLET ORAL at 15:51

## 2023-01-01 RX ADMIN — LIDOCAINE HYDROCHLORIDE 30 ML: 20 SOLUTION OROPHARYNGEAL at 22:08

## 2023-01-01 RX ADMIN — DOCUSATE SODIUM 50 MG AND SENNOSIDES 8.6 MG 2 TABLET: 8.6; 5 TABLET, FILM COATED ORAL at 16:34

## 2023-01-01 RX ADMIN — MORPHINE SULFATE 15 MG: 15 TABLET ORAL at 13:10

## 2023-01-01 RX ADMIN — DOCUSATE SODIUM 50 MG AND SENNOSIDES 8.6 MG 2 TABLET: 8.6; 5 TABLET, FILM COATED ORAL at 04:19

## 2023-01-01 RX ADMIN — MORPHINE SULFATE 15 MG: 15 TABLET, FILM COATED, EXTENDED RELEASE ORAL at 06:08

## 2023-01-01 RX ADMIN — OXYCODONE 5 MG: 5 TABLET ORAL at 20:57

## 2023-01-01 RX ADMIN — MAGNESIUM HYDROXIDE 30 ML: 1200 LIQUID ORAL at 16:37

## 2023-01-01 RX ADMIN — MORPHINE SULFATE 15 MG: 15 TABLET, FILM COATED, EXTENDED RELEASE ORAL at 17:52

## 2023-01-01 RX ADMIN — DOCUSATE SODIUM 50 MG AND SENNOSIDES 8.6 MG 2 TABLET: 8.6; 5 TABLET, FILM COATED ORAL at 17:26

## 2023-01-01 RX ADMIN — ENOXAPARIN SODIUM 40 MG: 100 INJECTION SUBCUTANEOUS at 16:34

## 2023-01-01 RX ADMIN — POLYETHYLENE GLYCOL 3350 1 PACKET: 17 POWDER, FOR SOLUTION ORAL at 17:25

## 2023-01-01 RX ADMIN — ONDANSETRON 4 MG: 2 INJECTION INTRAMUSCULAR; INTRAVENOUS at 18:45

## 2023-01-01 RX ADMIN — GABAPENTIN 100 MG: 100 CAPSULE ORAL at 21:00

## 2023-01-01 RX ADMIN — OXYCODONE HYDROCHLORIDE 10 MG: 10 TABLET ORAL at 13:49

## 2023-01-01 RX ADMIN — OMEPRAZOLE 20 MG: 20 CAPSULE, DELAYED RELEASE ORAL at 05:22

## 2023-01-01 RX ADMIN — ANTACID TABLETS 500 MG: 500 TABLET, CHEWABLE ORAL at 23:34

## 2023-01-01 RX ADMIN — OXYCODONE HYDROCHLORIDE 10 MG: 10 TABLET ORAL at 23:08

## 2023-01-01 RX ADMIN — ONDANSETRON 4 MG: 2 INJECTION INTRAMUSCULAR; INTRAVENOUS at 19:39

## 2023-01-01 RX ADMIN — ENOXAPARIN SODIUM 40 MG: 100 INJECTION SUBCUTANEOUS at 20:57

## 2023-01-01 RX ADMIN — MORPHINE SULFATE 4 MG: 4 INJECTION, SOLUTION INTRAMUSCULAR; INTRAVENOUS at 18:45

## 2023-01-01 RX ADMIN — OXYCODONE HYDROCHLORIDE 10 MG: 10 TABLET ORAL at 20:56

## 2023-01-01 RX ADMIN — OXYCODONE 5 MG: 5 TABLET ORAL at 00:56

## 2023-01-01 RX ADMIN — ONDANSETRON 4 MG: 2 INJECTION INTRAMUSCULAR; INTRAVENOUS at 06:05

## 2023-01-01 RX ADMIN — DEXAMETHASONE SODIUM PHOSPHATE 4 MG: 4 INJECTION INTRA-ARTICULAR; INTRALESIONAL; INTRAMUSCULAR; INTRAVENOUS; SOFT TISSUE at 13:09

## 2023-01-01 RX ADMIN — BACLOFEN 5 MG: 10 TABLET ORAL at 17:26

## 2023-01-01 RX ADMIN — DOCUSATE SODIUM 50 MG AND SENNOSIDES 8.6 MG 2 TABLET: 8.6; 5 TABLET, FILM COATED ORAL at 20:57

## 2023-01-01 RX ADMIN — SODIUM CHLORIDE: 9 INJECTION, SOLUTION INTRAVENOUS at 21:25

## 2023-01-01 RX ADMIN — ONDANSETRON 4 MG: 4 TABLET, ORALLY DISINTEGRATING ORAL at 20:56

## 2023-01-01 RX ADMIN — ACETAMINOPHEN 650 MG: 325 TABLET, FILM COATED ORAL at 13:45

## 2023-01-01 RX ADMIN — LACTULOSE 30 ML: 20 SOLUTION ORAL at 17:27

## 2023-01-01 RX ADMIN — NICOTINE 14 MG: 14 PATCH TRANSDERMAL at 05:07

## 2023-01-01 RX ADMIN — ONDANSETRON 4 MG: 4 TABLET, ORALLY DISINTEGRATING ORAL at 11:37

## 2023-01-01 RX ADMIN — OXYCODONE HYDROCHLORIDE 10 MG: 10 TABLET ORAL at 05:34

## 2023-01-01 RX ADMIN — NICOTINE 14 MG: 14 PATCH TRANSDERMAL at 06:02

## 2023-01-01 RX ADMIN — IOHEXOL 100 ML: 350 INJECTION, SOLUTION INTRAVENOUS at 17:45

## 2023-01-01 RX ADMIN — MORPHINE SULFATE 15 MG: 15 TABLET ORAL at 16:42

## 2023-01-01 RX ADMIN — OXYCODONE HYDROCHLORIDE 10 MG: 10 TABLET ORAL at 01:01

## 2023-01-01 RX ADMIN — OMEPRAZOLE 20 MG: 20 CAPSULE, DELAYED RELEASE ORAL at 06:02

## 2023-01-01 RX ADMIN — ENOXAPARIN SODIUM 40 MG: 100 INJECTION SUBCUTANEOUS at 17:52

## 2023-01-01 RX ADMIN — SODIUM CHLORIDE: 9 INJECTION, SOLUTION INTRAVENOUS at 21:03

## 2023-01-01 RX ADMIN — SODIUM CHLORIDE: 9 INJECTION, SOLUTION INTRAVENOUS at 06:22

## 2023-01-01 RX ADMIN — ONDANSETRON 4 MG: 2 INJECTION INTRAMUSCULAR; INTRAVENOUS at 07:34

## 2023-01-01 RX ADMIN — OXYCODONE 5 MG: 5 TABLET ORAL at 19:39

## 2023-01-01 RX ADMIN — DEXAMETHASONE SODIUM PHOSPHATE 4 MG: 4 INJECTION INTRA-ARTICULAR; INTRALESIONAL; INTRAMUSCULAR; INTRAVENOUS; SOFT TISSUE at 08:49

## 2023-01-01 RX ADMIN — CYCLOBENZAPRINE 10 MG: 10 TABLET, FILM COATED ORAL at 13:49

## 2023-01-01 RX ADMIN — GABAPENTIN 100 MG: 100 CAPSULE ORAL at 22:03

## 2023-01-01 RX ADMIN — OXYCODONE HYDROCHLORIDE 10 MG: 10 TABLET ORAL at 07:39

## 2023-01-01 RX ADMIN — OXYCODONE 5 MG: 5 TABLET ORAL at 09:29

## 2023-01-01 RX ADMIN — OXYCODONE HYDROCHLORIDE 10 MG: 10 TABLET ORAL at 02:26

## 2023-01-01 RX ADMIN — HYDROMORPHONE HYDROCHLORIDE 1 MG: 1 INJECTION, SOLUTION INTRAMUSCULAR; INTRAVENOUS; SUBCUTANEOUS at 07:33

## 2023-01-01 RX ADMIN — FAMOTIDINE 20 MG: 20 TABLET ORAL at 19:35

## 2023-01-01 RX ADMIN — SODIUM CHLORIDE: 9 INJECTION, SOLUTION INTRAVENOUS at 12:35

## 2023-01-01 RX ADMIN — HYDROMORPHONE HYDROCHLORIDE 1 MG: 1 INJECTION, SOLUTION INTRAMUSCULAR; INTRAVENOUS; SUBCUTANEOUS at 22:08

## 2023-01-01 RX ADMIN — DOCUSATE SODIUM 50 MG AND SENNOSIDES 8.6 MG 2 TABLET: 8.6; 5 TABLET, FILM COATED ORAL at 06:02

## 2023-01-01 RX ADMIN — ONDANSETRON 4 MG: 4 TABLET, ORALLY DISINTEGRATING ORAL at 13:09

## 2023-01-01 RX ADMIN — HYDROMORPHONE HYDROCHLORIDE 1 MG: 1 INJECTION, SOLUTION INTRAMUSCULAR; INTRAVENOUS; SUBCUTANEOUS at 10:16

## 2023-01-01 RX ADMIN — MORPHINE SULFATE 15 MG: 15 TABLET, FILM COATED, EXTENDED RELEASE ORAL at 05:22

## 2023-01-01 RX ADMIN — MORPHINE SULFATE 15 MG: 15 TABLET ORAL at 04:18

## 2023-01-01 RX ADMIN — DEXAMETHASONE SODIUM PHOSPHATE 4 MG: 4 INJECTION INTRA-ARTICULAR; INTRALESIONAL; INTRAMUSCULAR; INTRAVENOUS; SOFT TISSUE at 17:27

## 2023-01-01 RX ADMIN — SODIUM CHLORIDE, POTASSIUM CHLORIDE, SODIUM LACTATE AND CALCIUM CHLORIDE 2112 ML: 600; 310; 30; 20 INJECTION, SOLUTION INTRAVENOUS at 16:45

## 2023-01-01 RX ADMIN — OMEPRAZOLE 20 MG: 20 CAPSULE, DELAYED RELEASE ORAL at 04:19

## 2023-01-01 RX ADMIN — HYDROMORPHONE HYDROCHLORIDE 1 MG: 1 INJECTION, SOLUTION INTRAMUSCULAR; INTRAVENOUS; SUBCUTANEOUS at 00:07

## 2023-01-01 RX ADMIN — ENOXAPARIN SODIUM 40 MG: 100 INJECTION SUBCUTANEOUS at 17:27

## 2023-01-01 RX ADMIN — HYDROMORPHONE HYDROCHLORIDE 1 MG: 1 INJECTION, SOLUTION INTRAMUSCULAR; INTRAVENOUS; SUBCUTANEOUS at 03:10

## 2023-01-01 RX ADMIN — BACLOFEN 5 MG: 10 TABLET ORAL at 06:08

## 2023-01-01 RX ADMIN — DEXTROMETHORPHAN HYDROBROMIDE AND GUAIFENESIN 10 ML: 10; 100 LIQUID ORAL at 09:45

## 2023-01-01 RX ADMIN — BACLOFEN 5 MG: 10 TABLET ORAL at 17:52

## 2023-01-01 RX ADMIN — MORPHINE SULFATE 15 MG: 15 TABLET ORAL at 21:00

## 2023-01-01 RX ADMIN — OMEPRAZOLE 20 MG: 20 CAPSULE, DELAYED RELEASE ORAL at 06:08

## 2023-01-01 RX ADMIN — ACETAMINOPHEN 650 MG: 325 TABLET, FILM COATED ORAL at 07:39

## 2023-01-01 RX ADMIN — HYDROMORPHONE HYDROCHLORIDE 1 MG: 1 INJECTION, SOLUTION INTRAMUSCULAR; INTRAVENOUS; SUBCUTANEOUS at 20:54

## 2023-01-01 RX ADMIN — BACLOFEN 5 MG: 10 TABLET ORAL at 05:22

## 2023-01-01 RX ADMIN — NICOTINE 14 MG: 14 PATCH TRANSDERMAL at 04:19

## 2023-01-01 RX ADMIN — DEXAMETHASONE SODIUM PHOSPHATE 4 MG: 4 INJECTION INTRA-ARTICULAR; INTRALESIONAL; INTRAMUSCULAR; INTRAVENOUS; SOFT TISSUE at 07:40

## 2023-01-01 SDOH — ECONOMIC STABILITY: HOUSING INSECURITY: EVIDENCE OF SMOKING MATERIAL: 1

## 2023-01-01 SDOH — ECONOMIC STABILITY: HOUSING INSECURITY: EVIDENCE OF SMOKING MATERIAL: 0

## 2023-01-01 ASSESSMENT — ENCOUNTER SYMPTOMS
WEIGHT LOSS: 1
SHORTNESS OF BREATH: 1
INSOMNIA: 1
DYSPNEA ON EXERTION: 1
PAIN SEVERITY GOAL: 3/10
LOWEST PAIN SEVERITY IN PAST 24 HOURS: 2/10
WEIGHT LOSS: 1
CONSTIPATION: 1
PERIANAL NUMBNESS: 0
PAIN LOCATION: LEFT LEG
PAIN SEVERITY GOAL: 2/10
HIGHEST PAIN SEVERITY IN PAST 24 HOURS: 8/10
WEAKNESS: 0
VOMITING: 0
LAST BOWEL MOVEMENT: 66821
PAIN LOCATION - PAIN FREQUENCY: CONSTANT
DYSPNEA ON EXERTION: 1
CARDIOVASCULAR NEGATIVE: 1
COUGH: 1
VOMITING: 1
LAST BOWEL MOVEMENT: 66806
NERVOUS/ANXIOUS: 0
LOWEST PAIN SEVERITY IN PAST 24 HOURS: 3/10
COUGH: 1
LAST BOWEL MOVEMENT: 66825
BACK PAIN: 1
PAIN LOCATION - PAIN FREQUENCY: CONSTANT
PAIN SEVERITY GOAL: 3/10
INTRACTABLE COUGH: 1
PAIN: 1
PAIN LOCATION - PAIN SEVERITY: 5/10
COUGH CHARACTERISTICS: NON-PRODUCTIVE
STOOL FREQUENCY: LESS THAN DAILY
PERSON REPORTING PAIN: PATIENT
HIGHEST PAIN SEVERITY IN PAST 24 HOURS: 6/10
SPUTUM PRODUCTION: 1
FATIGUE: 1
CONSTIPATION: 1
FATIGUES EASILY: 1
LAST BOWEL MOVEMENT: 66812
COUGH: 1
PAIN: 1
PERSON REPORTING PAIN: PATIENT
PAIN: 1
PAIN SEVERITY GOAL: 3/10
ABDOMINAL PAIN: 0
PAIN LOCATION - PAIN FREQUENCY: FREQUENT
LOWEST PAIN SEVERITY IN PAST 24 HOURS: 3/10
COUGH: 1
COUGH: 1
PAIN LOCATION - PAIN QUALITY: DULL
STOOL FREQUENCY: LESS THAN DAILY
LOWEST PAIN SEVERITY IN PAST 24 HOURS: 3/10
FATIGUE: 1
SLEEP QUALITY: ADEQUATE
STOOL FREQUENCY: LESS THAN DAILY
LOWEST PAIN SEVERITY IN PAST 24 HOURS: 3/10
PAIN LOCATION: LEFT LEG/HIP
PAIN SEVERITY GOAL: 3/10
BACK PAIN: 1
PAIN SEVERITY GOAL: 3/10
LAST BOWEL MOVEMENT: 66807
FATIGUE: 1
PAIN SEVERITY GOAL: 2/10
COUGH: 1
PAIN SEVERITY GOAL: 5/10
PAIN LOCATION - EXACERBATING FACTORS: MOVEMENT
PAIN LOCATION - PAIN FREQUENCY: INTERMITTENT
INSOMNIA: 1
HIGHEST PAIN SEVERITY IN PAST 24 HOURS: 8/10
BOWEL PATTERN NORMAL: 1
PERSON REPORTING PAIN: PATIENT
NEUROLOGICAL NEGATIVE: 1
SLEEP QUALITY: ADEQUATE
SHORTNESS OF BREATH: 1
LOWEST PAIN SEVERITY IN PAST 24 HOURS: 4/10
SLEEP QUALITY: ADEQUATE
PERSON REPORTING PAIN: PATIENT
PAIN LOCATION - PAIN QUALITY: ACHE
PAIN LOCATION - PAIN QUALITY: DEEP ACHE
PAIN LOCATION: BACK, HIPS
PERSON REPORTING PAIN: PATIENT
SENSORY CHANGE: 1
PAIN LOCATION - PAIN SEVERITY: 5/10
LOWEST PAIN SEVERITY IN PAST 24 HOURS: 7/10
BACK PAIN: 1
PAIN: 1
LAST BOWEL MOVEMENT: 66832
STOOL FREQUENCY: LESS THAN DAILY
CONSTIPATION: 0
PERSON REPORTING PAIN: PATIENT
PAIN: 1
SPUTUM PRODUCTION: 1
SHORTNESS OF BREATH: 1
SENSORY CHANGE: 0
PAIN LOCATION - EXACERBATING FACTORS: MOVEMENT
FATIGUES EASILY: 1
SHORTNESS OF BREATH: 0
STOOL FREQUENCY: LESS THAN DAILY
SUBJECTIVE PAIN PROGRESSION: WAXING AND WANING
FATIGUES EASILY: 1
NAUSEA: 0
PAIN LOCATION - PAIN QUALITY: SHARP
BOWEL PATTERN NORMAL: 1
SUBJECTIVE PAIN PROGRESSION: GRADUALLY WORSENING
SENSORY CHANGE: 1
HIGHEST PAIN SEVERITY IN PAST 24 HOURS: 7/10
COUGH CHARACTERISTICS: NON-PRODUCTIVE
COUGH CHARACTERISTICS: NON-PRODUCTIVE
HIGHEST PAIN SEVERITY IN PAST 24 HOURS: 8/10
CONSTIPATION: 0
PAIN: 1
SUBJECTIVE PAIN PROGRESSION: WAXING AND WANING
SLEEP QUALITY: FAIR
COUGH: 1
DYSPNEA ON EXERTION: 1
SLEEP QUALITY: ADEQUATE
LOWEST PAIN SEVERITY IN PAST 24 HOURS: 2/10
SLEEP QUALITY: ADEQUATE
PAIN SEVERITY GOAL: 3/10
LAST BOWEL MOVEMENT: 66809
FATIGUES EASILY: 1
ABDOMINAL PAIN: 0
COUGH CHARACTERISTICS: NON-PRODUCTIVE
BOWEL INCONTINENCE: 0
SPUTUM PRODUCTION: 1
PERSON REPORTING PAIN: PATIENT
SLEEP QUALITY: ADEQUATE
PAIN LOCATION - PAIN DURATION: CHRONIC
PAIN LOCATION - PAIN SEVERITY: 5/10
PAIN: 1
FATIGUE: 1
SUBJECTIVE PAIN PROGRESSION: RAPIDLY WORSENING
SLEEP QUALITY: ADEQUATE
STOOL FREQUENCY: DAILY
SHORTNESS OF BREATH: 1
DYSPNEA ON EXERTION: 1
COUGH: 1
DYSPNEA ON EXERTION: 1
FEVER: 0
NAUSEA: 1
STOOL FREQUENCY: DAILY
RESPIRATORY NEGATIVE: 1
DYSPNEA ON EXERTION: 1
HIGHEST PAIN SEVERITY IN PAST 24 HOURS: 8/10
HIGHEST PAIN SEVERITY IN PAST 24 HOURS: 8/10
LOWEST PAIN SEVERITY IN PAST 24 HOURS: 3/10
SLEEP QUALITY: ADEQUATE
FATIGUE: 1
FATIGUES EASILY: 1
BACK PAIN: 1
HIGHEST PAIN SEVERITY IN PAST 24 HOURS: 10/10
SLEEP QUALITY: FAIR
PAIN LOCATION - PAIN DURATION: CHRONIC
PAIN: 1
DEPRESSION: 0
DYSPNEA ACTIVITY LEVEL: AFTER AMBULATING LESS THAN 10 FT
PERSON REPORTING PAIN: PATIENT
HIP PAIN: 1
SENSORY CHANGE: 1
HIGHEST PAIN SEVERITY IN PAST 24 HOURS: 7/10
FATIGUES EASILY: 1
CONSTITUTIONAL NEGATIVE: 1
PAIN: 1
PAIN LOCATION - PAIN SEVERITY: 7/10

## 2023-01-01 ASSESSMENT — FIBROSIS 4 INDEX
FIB4 SCORE: 1.66
FIB4 SCORE: 1.42
FIB4 SCORE: 1.28
FIB4 SCORE: 0.87

## 2023-01-01 ASSESSMENT — COGNITIVE AND FUNCTIONAL STATUS - GENERAL
MOBILITY SCORE: 21
MOVING TO AND FROM BED TO CHAIR: A LITTLE
SUGGESTED CMS G CODE MODIFIER DAILY ACTIVITY: CH
STANDING UP FROM CHAIR USING ARMS: A LITTLE
HELP NEEDED FOR BATHING: A LITTLE
DRESSING REGULAR LOWER BODY CLOTHING: A LITTLE
SUGGESTED CMS G CODE MODIFIER MOBILITY: CK
MOVING TO AND FROM BED TO CHAIR: A LITTLE
SUGGESTED CMS G CODE MODIFIER DAILY ACTIVITY: CJ
TURNING FROM BACK TO SIDE WHILE IN FLAT BAD: A LITTLE
MOVING FROM LYING ON BACK TO SITTING ON SIDE OF FLAT BED: A LITTLE
WALKING IN HOSPITAL ROOM: A LITTLE
MOBILITY SCORE: 17
DRESSING REGULAR UPPER BODY CLOTHING: A LITTLE
DAILY ACTIVITIY SCORE: 24
SUGGESTED CMS G CODE MODIFIER MOBILITY: CK
MOBILITY SCORE: 18
DAILY ACTIVITIY SCORE: 20
STANDING UP FROM CHAIR USING ARMS: A LITTLE
WALKING IN HOSPITAL ROOM: A LITTLE
CLIMB 3 TO 5 STEPS WITH RAILING: A LITTLE
MOVING TO AND FROM BED TO CHAIR: A LITTLE
TURNING FROM BACK TO SIDE WHILE IN FLAT BAD: A LITTLE
CLIMB 3 TO 5 STEPS WITH RAILING: A LOT
SUGGESTED CMS G CODE MODIFIER MOBILITY: CJ
MOVING FROM LYING ON BACK TO SITTING ON SIDE OF FLAT BED: A LITTLE
TOILETING: A LITTLE
TURNING FROM BACK TO SIDE WHILE IN FLAT BAD: A LITTLE
MOVING FROM LYING ON BACK TO SITTING ON SIDE OF FLAT BED: A LITTLE

## 2023-01-01 ASSESSMENT — PATIENT HEALTH QUESTIONNAIRE - PHQ9
9. THOUGHTS THAT YOU WOULD BE BETTER OFF DEAD, OR OF HURTING YOURSELF: NOT AT ALL
7. TROUBLE CONCENTRATING ON THINGS, SUCH AS READING THE NEWSPAPER OR WATCHING TELEVISION: MORE THAN HALF THE DAYS
8. MOVING OR SPEAKING SO SLOWLY THAT OTHER PEOPLE COULD HAVE NOTICED. OR THE OPPOSITE, BEING SO FIGETY OR RESTLESS THAT YOU HAVE BEEN MOVING AROUND A LOT MORE THAN USUAL: NOT AT ALL
4. FEELING TIRED OR HAVING LITTLE ENERGY: NEARLY EVERY DAY
2. FEELING DOWN, DEPRESSED, IRRITABLE, OR HOPELESS: NOT AT ALL
SUM OF ALL RESPONSES TO PHQ9 QUESTIONS 1 AND 2: 3
1. LITTLE INTEREST OR PLEASURE IN DOING THINGS: NEARLY EVERY DAY
6. FEELING BAD ABOUT YOURSELF - OR THAT YOU ARE A FAILURE OR HAVE LET YOURSELF OR YOUR FAMILY DOWN: NOT AL ALL
5. POOR APPETITE OR OVEREATING: NEARLY EVERY DAY
CLINICAL INTERPRETATION OF PHQ2 SCORE: 0
SUM OF ALL RESPONSES TO PHQ QUESTIONS 1-9: 14
3. TROUBLE FALLING OR STAYING ASLEEP OR SLEEPING TOO MUCH: NEARLY EVERY DAY
CLINICAL INTERPRETATION OF PHQ2 SCORE: 0

## 2023-01-01 ASSESSMENT — LIFESTYLE VARIABLES
HOW MANY TIMES IN THE PAST YEAR HAVE YOU HAD 5 OR MORE DRINKS IN A DAY: 0
ON A TYPICAL DAY WHEN YOU DRINK ALCOHOL HOW MANY DRINKS DO YOU HAVE: 1
HAVE YOU EVER FELT YOU SHOULD CUT DOWN ON YOUR DRINKING: NO
SUBSTANCE_ABUSE: 0
TOTAL SCORE: 0
ALCOHOL_USE: YES
EVER FELT BAD OR GUILTY ABOUT YOUR DRINKING: NO
TOTAL SCORE: 0
DOES PATIENT WANT TO TALK TO SOMEONE ABOUT QUITTING: NO
DOES PATIENT WANT TO STOP DRINKING: NO
HAVE PEOPLE ANNOYED YOU BY CRITICIZING YOUR DRINKING: NO
AVERAGE NUMBER OF DAYS PER WEEK YOU HAVE A DRINK CONTAINING ALCOHOL: 1
TOTAL SCORE: 0
EVER HAD A DRINK FIRST THING IN THE MORNING TO STEADY YOUR NERVES TO GET RID OF A HANGOVER: NO
CONSUMPTION TOTAL: NEGATIVE

## 2023-01-01 ASSESSMENT — ACTIVITIES OF DAILY LIVING (ADL)
SHOPPING_REQUIRES_ASSISTANCE: 1
AMBULATION ASSISTANCE: NON-AMBULATORY
AMBULATION_REQUIRES_ASSISTANCE: 1
BATHING_REQUIRES_ASSISTANCE: 1
CURRENT_FUNCTION: ONE PERSON
CURRENT_FUNCTION: TWO PERSON
AMBULATION_REQUIRES_ASSISTANCE: 1
AMBULATION ASSISTANCE: ONE PERSON
MONEY MANAGEMENT (EXPENSES/BILLS): INDEPENDENT
TOILETING: INDEPENDENT
LAUNDRY_REQUIRES_ASSISTANCE: 1
CURRENT_FUNCTION: ONE PERSON
CURRENT_FUNCTION: ONE PERSON
AMBULATION ASSISTANCE: ONE PERSON
AMBULATION ASSISTANCE: NON-AMBULATORY

## 2023-01-01 ASSESSMENT — PAIN DESCRIPTION - PAIN TYPE
TYPE: ACUTE PAIN
TYPE: ACUTE PAIN;CHRONIC PAIN
TYPE: ACUTE PAIN

## 2023-01-01 ASSESSMENT — SOCIAL DETERMINANTS OF HEALTH (SDOH)
ACTIVE STRESSOR - HEALTH CHANGES: 1
ACTIVE STRESSOR - HEALTH CHANGES: 1
ACTIVE STRESSOR - LOSS OF CONTROL: 1
ACTIVE STRESSOR - HEALTH CHANGES: 1
ACTIVE STRESSOR - HEALTH CHANGES: 1
ACTIVE STRESSOR - NO STRESS FACTORS: 1
ACTIVE STRESSOR - LOSS OF CONTROL: 1
ACTIVE STRESSOR - NO STRESS FACTORS: 1
ACTIVE STRESSOR - NO STRESS FACTORS: 1
ACTIVE STRESSOR - HEALTH CHANGES: 1

## 2023-01-01 ASSESSMENT — GAIT ASSESSMENTS
DISTANCE (FEET): 50
DEVIATION: ANTALGIC;STEP TO;BRADYKINETIC;SHUFFLED GAIT
ASSISTIVE DEVICE: FRONT WHEEL WALKER
DEVIATION: ANTALGIC;STEP TO
ASSISTIVE DEVICE: FRONT WHEEL WALKER
GAIT LEVEL OF ASSIST: STANDBY ASSIST
GAIT LEVEL OF ASSIST: STANDBY ASSIST
DISTANCE (FEET): 60

## 2023-01-01 ASSESSMENT — COPD QUESTIONNAIRES
DO YOU EVER COUGH UP ANY MUCUS OR PHLEGM?: YES, EVERY DAY
DURING THE PAST 4 WEEKS HOW MUCH DID YOU FEEL SHORT OF BREATH: SOME OF THE TIME
HAVE YOU SMOKED AT LEAST 100 CIGARETTES IN YOUR ENTIRE LIFE: YES
COPD SCREENING SCORE: 9

## 2023-01-01 ASSESSMENT — VISUAL ACUITY: OU: 1

## 2023-11-09 NOTE — PROGRESS NOTES
"Ning Maguire is a 63 y.o. female who presents for Back Pain (X1 month, lower center back to hips, constant pain shooting pain with movement )      HPI  This is a new problem. Ning Maguire is a 63 y.o. patient who presents to urgent care with c/o: 1 month low back pain. Woke up with low back pain one day. No unusual activity or injury. Painful to walk. Feels like she may collaspe on the left side. Has to drag her legs into car with her pant leg because of the pain.  Hard to climb up and down her stairs at home.  Pain is intermittent 5-8/10.  Movement makes the pain worse.   Denies difficulty with  bowel or bladder changes, numbness or tingling, weakness.  Hx of pulled back that only lasted a few days. Nothing that has lasted this long.   Tx tried: marijuana gumies. Tylenol, ibuprofen ( last dose last 8 pm - 400 mg) 0, icy hot cream, hot/ cold pack, ASA.     ROS See HPI    Allergies:     No Known Allergies    PMSFS Hx:  Past Medical History:   Diagnosis Date    Urinary tract infection, site not specified      History reviewed. No pertinent surgical history.  History reviewed. No pertinent family history.  Social History     Tobacco Use    Smoking status: Every Day     Current packs/day: 0.50     Types: Cigarettes    Smokeless tobacco: Not on file   Substance Use Topics    Alcohol use: No     Comment: rare       Problems:   Patient Active Problem List   Diagnosis    Lower urinary tract infectious disease       Medications:   No current outpatient medications on file prior to visit.     No current facility-administered medications on file prior to visit.        Objective:     /82 (BP Location: Right arm, Patient Position: Sitting, BP Cuff Size: Adult)   Pulse 94   Temp 36 °C (96.8 °F) (Temporal)   Resp 13   Ht 1.626 m (5' 4\")   Wt 73.5 kg (162 lb)   SpO2 95%   BMI 27.81 kg/m²     Physical Exam  Vitals and nursing note reviewed.   Constitutional:       General: She is not in acute " distress.     Appearance: Normal appearance. She is well-developed and well-groomed. She is not toxic-appearing.   HENT:      Head: Normocephalic.   Cardiovascular:      Rate and Rhythm: Normal rate and regular rhythm.      Pulses: Normal pulses.   Pulmonary:      Effort: Pulmonary effort is normal.      Breath sounds: Normal breath sounds.   Musculoskeletal:         General: Normal range of motion.      Thoracic back: Normal.      Lumbar back: Tenderness and bony tenderness present. No swelling. Normal range of motion. Negative right straight leg raise test and negative left straight leg raise test.   Skin:     General: Skin is warm and dry.      Capillary Refill: Capillary refill takes less than 2 seconds.      Findings: No rash.   Neurological:      Mental Status: She is alert and oriented to person, place, and time.      Sensory: Sensation is intact.      Motor: Motor function is intact.      Coordination: Coordination is intact.      Deep Tendon Reflexes:      Reflex Scores:       Patellar reflexes are 2+ on the right side and 2+ on the left side.  Psychiatric:         Attention and Perception: Attention normal.         Mood and Affect: Mood and affect normal.         Speech: Speech normal.         Behavior: Behavior normal. Behavior is cooperative.         Thought Content: Thought content normal.         Assessment /Associated Orders:      1. Acute bilateral low back pain without sciatica  ketorolac (Toradol) injection 30 mg    cyclobenzaprine (FLEXERIL) 5 mg tablet    naproxen (NAPROSYN) 500 MG Tab            Medical Decision Making:    Ning  is a very pleasant 63 y.o. female who is clinically stable at today's acute urgent care visit.  No acute distress noted.  VSS. Appropriate for outpatient care at this time.   Acute problem today with uncertain prognosis.   Educated in proper administration of  prescription medication(s) ordered today including safety, possible SE, risks, benefits, rationale and  alternatives to therapy.   Toradol given in clinic today. Tolerated well without adverse effects. Advised not to take NSAIDS for 6-8 hours post injection.   Educated not to drive, drink alcohol, operate machinery, or do anything that requires mental alertness while taking RX medication that has sedation/ drowsiness as a side effect.  Allow an 8-hour wear off time before participating in any of these activities.    Ice packs prn pain   Warm packs prn pain  Gentle ROM exercises prn   Activity as tolerated    Discussed Dx, management options (risks,benefits, and alternatives to planned treatment), natural progression and supportive care.  Expressed understanding and the treatment plan was agreed upon.   Questions were encouraged and answered   Return to urgent care prn if new or worsening sx or if there is no improvement in condition prn.    Educated in Red flags and indications to immediately call 911 or present to the Emergency Department.         Please note that this dictation was created using voice recognition software. I have worked with consultants from the vendor as well as technical experts from [facility] [employer] to optimize the interface. I have made every reasonable attempt to correct obvious errors, but I expect that there are errors of grammar and possibly content that I did not discover before finalizing the note.  This note was electronically signed by provider

## 2023-11-20 NOTE — PROGRESS NOTES
Subjective:     Ning Maguire is a 63 y.o. female who presents for Back Pain (X 1.5 mth Mid back pain down to hips. Pain when moving. Pt. Was seen 11/9. Pain has increased much more. Pt. States she cannot support herself while walking.)       Back Pain  This is a new problem. The problem has been gradually worsening since onset. Pertinent negatives include no abdominal pain, bladder incontinence, bowel incontinence, chest pain, dysuria, fever, perianal numbness or weakness.   Hip Pain  This is a new problem. The problem has been gradually worsening. Pertinent negatives include no abdominal pain, chest pain, fever, nausea, vomiting or weakness.     Patient seen in urgent care 11/9/2023.  Was having 1 month of lower back pain at that time.  Notes reviewed.  Pain worsened with range of motion and movement.  No known injury.  Patient treated with Flexeril as well as Toradol and naproxen for her symptoms.    Patient reports improvement for about 2 days after taking medication.  However, symptoms worsening again.  Reports right lower back pain which worsens with range of motion.  Reports left hip pain which worsens with range of motion.  Hip pain started after onset of back pain.  Reports she is unable to lift her left leg up onto a step due to pain.  Walking with a cane.  Reports pain prevents her from sleeping at night.    Review of Systems   Constitutional: Negative.  Negative for fever.   Respiratory: Negative.  Negative for shortness of breath.    Cardiovascular: Negative.  Negative for chest pain.   Gastrointestinal:  Negative for abdominal pain, bowel incontinence, nausea and vomiting.   Genitourinary:  Negative for bladder incontinence, dysuria, frequency and urgency.   Musculoskeletal:  Positive for back pain.        Left hip pain   Neurological: Negative.  Negative for sensory change and weakness.   Psychiatric/Behavioral:  The patient has insomnia.    All other systems reviewed and are  "negative.    Refer to HPI for additional details.    During this visit, appropriate PPE was worn, and hand hygiene was performed.    PMH:  has a past medical history of Urinary tract infection, site not specified.    MEDS:   Current Outpatient Medications:     predniSONE (DELTASONE) 20 MG Tab, Take 3 tabs daily x 3 days, then 2 tabs daily x 2 days, then 1 tab x 2 days., Disp: 15 Tablet, Rfl: 0    cyclobenzaprine (FLEXERIL) 5 mg tablet, Take 1-2 Tablets by mouth every 8 hours as needed for Muscle Spasms., Disp: 30 Tablet, Rfl: 0    HYDROcodone-acetaminophen (NORCO) 5-325 MG Tab per tablet, Take 1 Tablet by mouth every 8 hours as needed (severe pain) for up to 5 days., Disp: 15 Tablet, Rfl: 0    naproxen (NAPROSYN) 500 MG Tab, Take 1 Tablet by mouth 2 times a day as needed (back pain)., Disp: 30 Tablet, Rfl: 0    cyclobenzaprine (FLEXERIL) 5 mg tablet, Take 1-2 Tablets by mouth 3 times a day as needed for Moderate Pain or Muscle Spasms., Disp: 30 Tablet, Rfl: 0    ALLERGIES: No Known Allergies  SURGHX: History reviewed. No pertinent surgical history.  SOCHX:  reports that she has been smoking cigarettes. She does not have any smokeless tobacco history on file. She reports current drug use. Drug: Marijuana. She reports that she does not drink alcohol.    FH: Per HPI as applicable/pertinent.      Objective:     /80 (BP Location: Right arm, Patient Position: Sitting, BP Cuff Size: Adult)   Pulse (!) 106   Temp 36.5 °C (97.7 °F) (Temporal)   Resp 18   Ht 1.6 m (5' 3\")   Wt 73.5 kg (162 lb)   SpO2 94%   BMI 28.70 kg/m²     Physical Exam  Nursing note reviewed.   Constitutional:       General: She is not in acute distress.     Appearance: She is well-developed. She is not ill-appearing or toxic-appearing.   Eyes:      General: Vision grossly intact.   Cardiovascular:      Rate and Rhythm: Normal rate.   Pulmonary:      Effort: Pulmonary effort is normal. No respiratory distress.   Musculoskeletal:         " General: No deformity. Normal range of motion.      Lumbar back: Tenderness (Right paraspinous region) present. No swelling, deformity or lacerations. Normal range of motion. Negative right straight leg raise test and negative left straight leg raise test.      Left hip: Tenderness present. No deformity. Normal range of motion.   Skin:     General: Skin is warm and dry.      Coloration: Skin is not pale.   Neurological:      Mental Status: She is alert and oriented to person, place, and time.      Motor: No weakness.      Gait: Gait abnormal (Antalgic).   Psychiatric:         Behavior: Behavior normal. Behavior is cooperative.     X-ray of left hip:    Details    Reading Physician Reading Date Result Priority   Hortensia Penaloza M.D.  [phone] 11/20/2023      Narrative & Impression     11/20/2023 3:13 PM     HISTORY/REASON FOR EXAM:  Left hip pain.     TECHNIQUE/EXAM DESCRIPTION AND NUMBER OF VIEWS:  2 views of the LEFT hip.     COMPARISON: None     FINDINGS:     BONE MINERALIZATION: Normal.  JOINTS: Preserved. No erosions.  FRACTURE: None.  DISLOCATION: None.  SOFT TISSUES: No mass.  Pelvic phleboliths.     IMPRESSION:     Preserved hip joints. No fracture or dislocation.           Exam Ended: 11/20/23  3:33 PM Last Resulted: 11/20/23  3:48 PM           X-ray of lumbar spine:    Details    Reading Physician Reading Date Result Priority   Hortensia Penaloza M.D.  [phone] 11/20/2023      Narrative & Impression     11/20/2023 3:13 PM     HISTORY/REASON FOR EXAM:  Back pain.     TECHNIQUE/ EXAM DESCRIPTION AND NUMBER OF VIEWS:  3 views of the lumbar spine.     COMPARISON: None.     FINDINGS:  No acute fracture is detected.     Preserved lumbar lordosis. Lumbar dextroscoliosis.     Mild to moderate lumbar spondylosis, with multilevel disc height loss and endplate spurring. Lower lumbar facet hypertrophy.  No listhesis.     Atherosclerosis.     IMPRESSION:     1.  Mild to moderate lumbar spondylosis. No  acute fracture or listhesis.  2.  Lumbar dextroscoliosis.           Exam Ended: 11/20/23  3:33 PM Last Resulted: 11/20/23  3:47 PM               Radiology report and images reviewed by myself. Concur with findings.      Assessment/Plan:     1. Left hip pain  - DX-HIP-COMPLETE - UNILATERAL 2+ LEFT; Future  - predniSONE (DELTASONE) 20 MG Tab; Take 3 tabs daily x 3 days, then 2 tabs daily x 2 days, then 1 tab x 2 days.  Dispense: 15 Tablet; Refill: 0  - HYDROcodone-acetaminophen (NORCO) 5-325 MG Tab per tablet; Take 1 Tablet by mouth every 8 hours as needed (severe pain) for up to 5 days.  Dispense: 15 Tablet; Refill: 0    2. Acute right-sided low back pain without sciatica  - DX-LUMBAR SPINE-2 OR 3 VIEWS; Future  - predniSONE (DELTASONE) 20 MG Tab; Take 3 tabs daily x 3 days, then 2 tabs daily x 2 days, then 1 tab x 2 days.  Dispense: 15 Tablet; Refill: 0  - cyclobenzaprine (FLEXERIL) 5 mg tablet; Take 1-2 Tablets by mouth every 8 hours as needed for Muscle Spasms.  Dispense: 30 Tablet; Refill: 0  - HYDROcodone-acetaminophen (NORCO) 5-325 MG Tab per tablet; Take 1 Tablet by mouth every 8 hours as needed (severe pain) for up to 5 days.  Dispense: 15 Tablet; Refill: 0    3. DDD (degenerative disc disease), lumbar  - predniSONE (DELTASONE) 20 MG Tab; Take 3 tabs daily x 3 days, then 2 tabs daily x 2 days, then 1 tab x 2 days.  Dispense: 15 Tablet; Refill: 0  - HYDROcodone-acetaminophen (NORCO) 5-325 MG Tab per tablet; Take 1 Tablet by mouth every 8 hours as needed (severe pain) for up to 5 days.  Dispense: 15 Tablet; Refill: 0    Other orders  - Consent for Opiate Prescription    Right lower back pain probably related to DDD. Left hip pain occurred after start of right lower back pain, possibly related to antalgic gait and over compensation. Left hip x-ray unremarkable.    Rx as above sent electronically. Advised to avoid NSAIDs while on steroid therapy. Rest. Alterate ice/heat. Patient appreciates Norco for breakthrough  pain.    PDMP and Opioid Risk Tool show low risk of controlled substance abuse for this patient. Patient counseled on risks and benefits of controlled substances. Not for use at work or while driving. Handout provided. Written consent received for short-term prescription of controlled substance for adequate control of pain.     Patient declines referral to sports medicine at this time.    Monitor. Warning signs reviewed. Return precautions advised.     Differential diagnosis, natural history, supportive care, over-the-counter symptom management per 's instructions, close monitoring, and indications for immediate follow-up discussed.     All questions answered. Patient agrees with the plan of care.    Billing note: moderate complexity and moderate risk. Established patient. [id number]. Please refer to LOS tool for details.

## 2023-11-24 PROBLEM — E83.52 HYPERCALCEMIA: Status: ACTIVE | Noted: 2023-01-01

## 2023-11-24 PROBLEM — J96.01 ACUTE HYPOXEMIC RESPIRATORY FAILURE (HCC): Status: ACTIVE | Noted: 2023-01-01

## 2023-11-24 PROBLEM — D72.829 LEUKOCYTOSIS: Status: ACTIVE | Noted: 2023-01-01

## 2023-11-24 PROBLEM — R63.1 ALWAYS THIRSTY: Status: ACTIVE | Noted: 2023-01-01

## 2023-11-24 PROBLEM — C79.9 METASTATIC CANCER (HCC): Status: ACTIVE | Noted: 2023-01-01

## 2023-11-24 NOTE — ED NOTES
Taken patient from triage waiting room, via WC alert/ oriented x 4.Verified patient identification.  Assumed patient care.   Placed on patient room. Changed clothes to hospital gown. Connected to cardiac monitor.   Given the call light and instructed to call for any assistance needed/ or concerns.   Bed on lowest position, side rails up, breaks locked. Awaiting for ERP.

## 2023-11-24 NOTE — ED PROVIDER NOTES
ED Provider Note    CHIEF COMPLAINT  Chief Complaint   Patient presents with    Hip Pain     Bilateral hip pain since November 20th. Was seen on  at this time and prescribed prednisone and hydrocodone for pain.  Worsening pain since. Now unable to walk on her own even when utilizing cane since pain too unbearable.      EXTERNAL RECORDS REVIEWED  Outpatient Notes hip pain and back pain on 11/20.    HPI/ROS  LIMITATION TO HISTORY   Select: : None  OUTSIDE HISTORIAN(S):  Family at bedside, pt is primary historian    Ningleatha Maguire is a 63 y.o. female who presents the emergency room for evaluation of worsening lower and mid back pain and hip discomfort.  Patient states about a month to a month and a half ago the patient awoke with spontaneous pelvic discomfort in around the back and towards the pelvic brim.  This was not associated with any abdominal discomfort, she occasionally had some radiation and since then has had a lot of worsening pain and discomfort of the hip.  This has been followed by  Prior outpatient encounters where she has had some x-rays of the hip and low back that apparently were normal.  Patient is a chronic smoker, she now presents with this pain and so intractable she was unable to walk.  She is having pain that is worse with attempted movements, radiates throughout the low back and pelvis and hips and was noted in triage to have some slight hypoxia.  She is not a regular visit to physicians and has no diagnosed medical problems and is unaware of the need to be taking any medications.    PAST MEDICAL HISTORY   has a past medical history of Urinary tract infection, site not specified.    SURGICAL HISTORY  patient denies any surgical history    FAMILY HISTORY  No family history on file.    SOCIAL HISTORY  Social History     Tobacco Use    Smoking status: Every Day     Current packs/day: 0.50     Types: Cigarettes    Smokeless tobacco: Not on file   Vaping Use    Vaping Use: Never used  "  Substance and Sexual Activity    Alcohol use: No     Comment: rare    Drug use: Yes     Types: Marijuana     Comment: gummies    Sexual activity: Not on file     CURRENT MEDICATIONS  Home Medications       Reviewed by Brenda Angel (Pharmacy Tech) on 11/24/23 at 1625  Med List Status: Complete     Medication Last Dose Status   cyclobenzaprine (FLEXERIL) 5 mg tablet 11/24/2023 Active   cyclobenzaprine (FLEXERIL) 5 mg tablet DUPLICATE Active   HYDROcodone-acetaminophen (NORCO) 5-325 MG Tab per tablet 11/23/2023 Active   naproxen (NAPROSYN) 500 MG Tab 11/20/2023 Active   predniSONE (DELTASONE) 20 MG Tab 11/24/2023 Active                  ALLERGIES  No Known Allergies    PHYSICAL EXAM  VITAL SIGNS: BP (!) 155/74   Pulse 95   Temp 36.1 °C (97 °F) (Temporal)   Resp 17   Ht 1.626 m (5' 4\")   Wt 70.4 kg (155 lb 3.3 oz)   SpO2 92%   BMI 26.64 kg/m²    Genl: F sitting in gurney uncomfortably, speaking clearly, appears in mild distress   Head: NC/AT   ENT: Mucous membranes moist, posterior pharynx clear, uvula midline, nares patent bilaterally   Eyes: Normal sclera, pupils equal round reactive to light  Neck: Supple, FROM, no LAD appreciated   Pulmonary: Lungs are clear to auscultation bilaterally  Chest: No TTP  CV: Tachycardia, no murmur appreciated, pulses 2+ in both upper and lower extremities,  Abdomen: soft, NT/ND; no rebound/guarding, no masses palpated, no HSM   Musculoskeletal: Pain free ROM of the neck.  Movement of the upper and lower extremities is intact however pain is elicited in the lower extremities with intentional movements of the bilateral hip joints.  There is no pain with internal or external rotation and there is no delayed cap refill or leg swelling.  Back: Nonspecific tenderness is noted on the mid thoracic down to the lumbar and pelvic region.  No step-offs or deformities.  No skin changes.  Neuro: A&Ox4 (person, place, time, situation), speech fluent, gait not assessed due to pain, no " focal deficits appreciated, No cerebellar signs. Sensation is grossly intact in the distal upper and lower extremities.  5/5 strength in  and dorsiflexion/plantar flexion of the ankles  Skin: No rash or lesions.  No pallor or jaundice.  No cyanosis.  Warm and dry.     DIAGNOSTIC STUDIES / PROCEDURES  EKG  I have independently interpreted this EKG  Results for orders placed or performed during the hospital encounter of 23   EKG   Result Value Ref Range    Report       [facility] Emergency Dept.    Test Date:  2023  Pt Name:    DESTINI THOMAS            Department: ER  MRN:        [id number]                      Room:       [facility]  Gender:     Female                       Technician: [id number]  :        1960                   Requested By:ARMAND RODRIGUEZ  Order #:    [id number]                    Reading MD:    Measurements  Intervals                                Axis  Rate:       102                          P:          61  MO:         155                          QRS:        72  QRSD:       92                           T:          39  QT:         326  QTc:        425    Interpretive Statements  Sinus tachycardia  Probable left atrial enlargement  No previous ECG available for comparison       LABS  Labs Reviewed   LACTIC ACID - Abnormal; Notable for the following components:       Result Value    Lactic Acid 3.3 (*)     All other components within normal limits   CBC WITH DIFFERENTIAL - Abnormal; Notable for the following components:    WBC 16.0 (*)     Neutrophils-Polys 78.60 (*)     Lymphocytes 13.60 (*)     Immature Granulocytes 1.10 (*)     Neutrophils (Absolute) 12.54 (*)     Monos (Absolute) 1.05 (*)     Immature Granulocytes (abs) 0.17 (*)     All other components within normal limits   COMP METABOLIC PANEL - Abnormal; Notable for the following components:    Sodium 132 (*)     Glucose 119 (*)     Calcium 13.1 (*)     Correct Calcium 12.5 (*)     Alkaline Phosphatase 131  (*)     All other components within normal limits   IONIZED CALCIUM - Abnormal; Notable for the following components:    Ionized Calcium 1.6 (*)     All other components within normal limits   LACTIC ACID    Narrative:     Repeat if initial lactic acid result is greater than 2   ESTIMATED GFR   TROPONIN    Narrative:     Biotin intake of greater than 5 mg per day may interfere with  troponin levels, causing false low values.   MAGNESIUM   PHOSPHORUS   URINALYSIS   URINE CULTURE(NEW)   BLOOD CULTURE   BLOOD CULTURE    Narrative:     Blood Cultures X2. Draw one blood culture from central line  (including implanted port) and one blood culture  peripherally. If no central line present draw blood cultures  times two peripherally from different sites  Release to patient->Immediate     RADIOLOGY  I have independently interpreted the diagnostic imaging associated with this visit and am waiting the final reading from the radiologist.   My preliminary interpretation is as follows: Chest x-ray shows some right to mid lower lung atelectasis.  Radiologist interpretation:   DX-CHEST-PORTABLE (1 VIEW)   Final Result      Areas of atelectasis within the right mid to lower lung.      CT-CHEST,ABDOMEN,PELVIS WITH    (Results Pending)   CT-TSPINE W/O PLUS RECONS    (Results Pending)   CT-LSPINE W/O PLUS RECONS    (Results Pending)     COURSE & MEDICAL DECISION MAKING    ED Observation Status? No; Patient does not meet criteria for ED Observation.     INITIAL ASSESSMENT, COURSE AND PLAN  Care Narrative: Patient presents to the emergency room for symptoms as described above.  The patient is alert and oriented, she has had about a month and a half of pain and discomfort that was not provoked by any trauma and has not really had any sort of musculoskeletal complaints since.  She continues to have this pain that seems out of proportion and presented to the emergency room as pain medications at the outside urgent care had not alleviate her  symptoms.  On arrival she is tachycardic, she has some slight hypertension and she does not see doctors.  She had some soft hypoxia in triage between 88 and 91% but she is an active smoker.  There is no pleuritic chest discomfort, no consolidations or wheezes.  Lab work had partially come back prior to seeing the patient was noted that her calcium was extremely high at 13.1.  Ionized calcium is currently pending.  Patient having this significant of a metabolic derangements in the setting of pain could be because of the isolated metabolic change itself however I am concerned about the possibility of malignancy.  CT chest abdomen pelvis is ordered for screening in addition to CT scans of the entirety of the thoracic and lumbar spine.    She is treated with IV fluids for helping washout this electrolyte abnormality in addition to pain control.    Further lab work shows leukocytosis of 16, leftward shift, monocyte predominance.  Calcium of 13.1 with an ionized calcium of 1.6.  Magnesium/Phos are normal, no elevated lactate.  Cultures pending.  Fluid resuscitation started for hypercalcemia and at the time of signout patient is currently pending the results of her CT abdomen pelvis and spinal films.  I would anticipate high likelihood of ability long bone malignancy    Plan is for admission to the hospitalist in guarded condition.    HYDRATION: Based on the patient's presentation of Dehydration, Tachycardia, and Other hypercalcemia the patient was given IV fluids. IV Hydration was used because oral hydration was not as rapid as required. Upon recheck following hydration, the patient was improving.    FINAL DIAGNOSIS  1. Arthralgia, unspecified joint    2. Acute low back pain without sciatica, unspecified back pain laterality    3. Tachycardia    4. Hypercalcemia      Electronically signed by: Hermann Adame M.D., 11/24/2023 3:58 PM

## 2023-11-24 NOTE — ED TRIAGE NOTES
Ning Maguire  63 y.o. female  Chief Complaint   Patient presents with    Hip Pain     Bilateral hip pain since November 20th. Was seen on UC at this time and prescribed prednisone and hydrocodone for pain.  Worsening pain since. Now unable to walk on her own even when utilizing cane since pain too unbearable.        Pt utilized w/c to triage with steady gait for above complaint. Able to stand for weight with SBA and back to w/c.   Pt is alert and oriented, speaking in full sentences, follows commands and responds appropriately to questions. Not in any apparent distress. Respirations are even and unlabored.  Pt placed in lobby. Pt educated on triage process. Pt encouraged to alert staff for any changes.

## 2023-11-25 NOTE — ED NOTES
Patient transferred to the floor accompanied by Floor advance tech, patient AAO X4, respirations even and unlabored on 2 liters O2 via nasal canula, in possession of personal belongings.    SPD called for transport.

## 2023-11-25 NOTE — CARE PLAN
The patient is Watcher - Medium risk of patient condition declining or worsening    Shift Goals  Clinical Goals: Monitor labs, pain control and safety  Patient Goals: Pain management    Progress made toward(s) clinical / shift goals:    Problem: Pain - Standard  Goal: Alleviation of pain or a reduction in pain to the patient’s comfort goal  Outcome: Progressing  Note: Using numeric pain scale, administering medications as ordered, and reassessing pain.       Problem: Fall Risk  Goal: Patient will remain free from falls  Outcome: Progressing  Note: Call light and personal belongings placed within reach. Bed locked and in lowest position. Bed alarm on. Patient reminded to call for assistance.       Patient is not progressing towards the following goals:

## 2023-11-25 NOTE — ASSESSMENT & PLAN NOTE
CT abdomen pelvis revealed multiple areas of metastatic cancer in the lung, liver, bone, peritoneum, and breast.  Unclear primary tumor possibly lung versus breast.  Patient has never undergone a colonoscopy nor mammogram.  She had her uterus and cervix removed approximately 20 years ago.  History of 50-pack-year history.  Currently smoking 5 cigarettes/day.  As of 11/24, the patient declines chemotherapy and radiation.    11/25 patient declines biopsy and wanting to discuss chemotherapy and radiation options even if for palliative comfort     Plan  Consult palliative care   POLST completed   Baclofen for hiccups  Dexamethasone for bone pain   MS contin 15 mg q12h   Gabapentin 100mg  Oxycodone and dilaudid for breakthrough pain   After discussions with primary team, palliative and case management patient wishes to pursue hospice options  Plan to go home with [facility], in process of setting up   Manage pain   PT/OT ordered due to pain in lower back likely caused by lytic lesions

## 2023-11-25 NOTE — PROGRESS NOTES
4 Eyes Skin Assessment Completed by KRYSTIN Russell and DAISY Guardado.    Head WDL  Ears Redness and Blanching  Nose WDL  Mouth WDL  Neck WDL  Breast/Chest WDL  Shoulder Blades WDL  Spine WDL  (R) Arm/Elbow/Hand WDL  (L) Arm/Elbow/Hand WDL  Abdomen Scar  Groin WDL  Scrotum/Coccyx/Buttocks WDL  (R) Leg WDL  (L) Leg WDL  (R) Heel/Foot/Toe WDL  (L) Heel/Foot/Toe WDL          Devices In Places Tele Box, Blood Pressure Cuff, Pulse Ox, and Nasal Cannula      Interventions In Place NC W/Ear Foams and Pillows    Possible Skin Injury No    Pictures Uploaded Into Epic N/A  Wound Consult Placed N/A  RN Wound Prevention Protocol Ordered No

## 2023-11-25 NOTE — ED NOTES
Med Rec complete per patient and RX bottle at bedside   Allergies reviewed  Antibiotics in the past 30 days:NO  Anticoagulant in past 14 days:NO  Pharmacy patient utilizes:[facility] [address]    Pt states her DR discontinued Naproxyn    Pt states today is the first day of her taking 2 tabs QD of Prednisone course

## 2023-11-25 NOTE — ASSESSMENT & PLAN NOTE
Patient reports being thirsty often, and has never been evaluated for diabetes.  A1c 6.2, pre diabetic     Plan:  Patient does not want IV fluids   Will encourage oral intake

## 2023-11-25 NOTE — PROGRESS NOTES
"[facility] MEDICINE PROGRESS NOTE     Attending: Navin Denis M.d.    Resident: Mackenzie Willis M.D.    PATIENT: Ning Maguire; [id number]; 1960    ID: 63 y.o. female admitted for hypercalcemia likely secondary to metastatic cancer with unknown primary tumor.     SUBJECTIVE:   Patient was admitted overnight. This morning patient was aware of the findings on her imaging yesterday stating \"it is cancer and the word metastasis was used\" and \"it is in my breast, lung, back and hips.\" Discussion about options were presented to patient. Ning states that \"it is what it is\" and declined biopsy at this time. Her reasoning was she would not want to have radiation or chemo therapy even if palliative due to side effects. She would like to have symptoms managed at this time. She has not had discussion with her son yet.     OBJECTIVE:     Vitals:    11/25/23 0408 11/25/23 0754 11/25/23 1100 11/25/23 1123   BP: 124/72 133/83 129/78    Pulse: 87 91 (!) 110 (!) 110   Resp: 16 16 18    Temp: 36.4 °C (97.5 °F) 36.3 °C (97.3 °F) 36.4 °C (97.5 °F)    TempSrc: Temporal Temporal Temporal    SpO2: 95% 90% 95% 97%   Weight:       Height:         No intake or output data in the 24 hours ending 11/25/23 0537    PE:   General: No acute distress, resting comfortably in bed.  HEENT: NC/AT. PERRLA. EOMI. MMM  Cardiovascular: Normal S1/S2, RRR, no m/r/g  Respiratory: Symmetric inspiratory effort. CTAB with no adventitious sounds  Abdomen: BS+, soft, NT/ND   EXT:  MARCUS, 5/5 strength, 2+ pulses, no rashes, bruising, or bleeding. No pain to palpation of lower back   Neuro: Non focal with no numbness, tingling or changes in sensation    LABS:  Recent Labs     11/24/23  1449 11/25/23  0138   WBC 16.0* 15.4*   RBC 5.04 5.01   HEMOGLOBIN 14.8 14.6   HEMATOCRIT 43.7 44.5   MCV 86.7 88.8   MCH 29.4 29.1   RDW 43.9 45.2   PLATELETCT 354 275   MPV 9.8 10.3   NEUTSPOLYS 78.60* 62.20   LYMPHOCYTES 13.60* 28.10   MONOCYTES 6.60 8.20   EOSINOPHILS " "0.00 0.30   BASOPHILS 0.10 0.30     Recent Labs     11/24/23  1449 11/25/23  0019   SODIUM 132* 134*   POTASSIUM 4.2 4.6   CHLORIDE 96 97   CO2 23 27   BUN 19 17   CREATININE 0.85 0.92   CALCIUM 13.1* 12.1*   MAGNESIUM 1.8  --    PHOSPHORUS 2.6  --    ALBUMIN 4.7 4.1     Estimated GFR/CRCL = Estimated Creatinine Clearance: 58.4 mL/min (by C-G formula based on SCr of 0.92 mg/dL).  Recent Labs     11/24/23  1449 11/25/23  0019   GLUCOSE 119* 125*     Recent Labs     11/24/23  1449 11/25/23  0019   ASTSGOT 15 18   ALTSGPT 10 11   TBILIRUBIN 0.3 0.3   ALKPHOSPHAT 131* 119*   GLOBULIN 3.2 2.8             No results for input(s): \"INR\", \"APTT\", \"FIBRINOGEN\" in the last 72 hours.    Invalid input(s): \"DIMER\"    MICROBIOLOGY:   Results       Procedure Component Value Units Date/Time    Blood Culture - Draw one set from central line if present [[id number]] Collected: 11/24/23 1449    Order Status: Completed Specimen: Blood from Line Updated: 11/25/23 0701     Significant Indicator NEG     Source BLD     Site PERIPHERAL     Culture Result No Growth  Note: Blood cultures are incubated for 5 days and  are monitored continuously.Positive blood cultures  are called to the RN and reported as soon as  they are identified.      Narrative:      Blood Cultures X2. Draw one blood culture from central line  (including implanted port) and one blood culture  peripherally. If no central line present draw blood cultures  times two peripherally from different sites  Release to patient->Immediate  Right AC    Urinalysis [[id number]]     Order Status: Sent Specimen: Urine     Urine Culture (New) [[id number]]     Order Status: Sent Specimen: Urine     Blood Culture - Draw one set from central line if present [[id number]]     Order Status: Sent Specimen: Blood from Peripheral               IMAGING:   CT-LSPINE W/O PLUS RECONS   Final Result      1.  Multiple lytic osseous lesions seen within the lumbar spine, sacrum and incompletely visualized left " iliac bone.   2.  Lytic destructive L3 mass involving the posterior right vertebral body, pedicle and posterior elements with right spinal epidural and foraminal tumor extension.   3.  Lumbar dextroscoliosis and mild spondylosis.      CT-TSPINE W/O PLUS RECONS   Final Result      1.  No evidence of fracture or destructive lesion of the thoracic spine.      2.  Again noted right hilar lung mass with a cavitary component.      CT-CHEST,ABDOMEN,PELVIS WITH   Final Result      1.  Right mediastinal and hilar mass in keeping with malignancy, presumably primary lung malignancy as described above.   2.  Ill-defined 1.7 cm hepatic lesion raises suspicion for liver metastasis.   3.  Multiple lytic osseous metastases, including large left iliac lesion, L3 osseous lesion with right-sided epidural extension, and mid sacral metastasis.   4.  1 cm upper anterior abdominal peritoneal lesion is suspicious for peritoneal metastasis.   5.  1.4 cm right breast mass could be a primary tumor or metastatic.   6.  Further evaluation with PET CT may be performed.      DX-CHEST-PORTABLE (1 VIEW)   Final Result      Areas of atelectasis within the right mid to lower lung.          MEDS:  Current Facility-Administered Medications   Medication Last Admin    oxyCODONE immediate-release (Roxicodone) tablet 5 mg 5 mg at 11/25/23 0056    Or    oxyCODONE immediate release (Roxicodone) tablet 10 mg 10 mg at 11/25/23 1134    HYDROmorphone (Dilaudid) injection 1 mg 1 mg at 11/25/23 0310    senna-docusate (Pericolace Or Senokot S) 8.6-50 MG per tablet 2 Tablet 2 Tablet at 11/25/23 0508    And    polyethylene glycol/lytes (Miralax) PACKET 1 Packet      And    magnesium hydroxide (Milk Of Magnesia) suspension 30 mL      And    bisacodyl (Dulcolax) suppository 10 mg      Respiratory Therapy Consult      enoxaparin (Lovenox) inj 40 mg 40 mg at 11/24/23 2057    acetaminophen (Tylenol) tablet 650 mg      ondansetron (Zofran) syringe/vial injection 4 mg       ondansetron (Zofran ODT) dispertab 4 mg 4 mg at 11/25/23 1137    labetalol (Normodyne/Trandate) injection 10 mg      nicotine (Nicoderm) 14 MG/24HR 14 mg 14 mg at 11/25/23 0507    And    nicotine polacrilex (Nicorette) 2 MG piece 2 mg      NS infusion New Bag at 11/25/23 1235       ASSESSMENT/PLAN: iNng Maguire is a 63 y.o. female admitted for hypercalcemia likely 2/2 metastatic cancer of unknown primary tumor site.    * Hypercalcemia- (present on admission)  Assessment & Plan  Likely secondary to malignancy as the patient was found to have metastatic disease on CT scan.  S/p ~2L of LR in the emergency department.  PTH 8.0. With repeat calcium of 12.     Plan:  NS maintenance fluids 150 ml/hr   Repeat CMP in morning   Monitor for symptoms   Monitor for fluid overload.    Acute hypoxemic respiratory failure (HCC)  Assessment & Plan  Patient being evaluated for malignancy.  Concern for primary tumor in lung as the patient has a 50-pack-year history.    Plan:  Continue supplemental oxygen  See #metastatic cancer    Leukocytosis  Assessment & Plan  Present on CBC. Likely due to a combination of malignancy and demargination as the patient has currently received 4 days of steroids.      Plan:  Monitor for fevers.  Discontinue steroids.  Trend CBC.    Always thirsty  Assessment & Plan  Patient reports being thirsty often, and has never been evaluated for diabetes.  A1c 6.2, pre diabetic     Plan:  Continue to monitor for symptoms see if it relieved with IV hydration     Metastatic cancer (HCC)  Assessment & Plan  CT abdomen pelvis revealed multiple areas of metastatic cancer in the lung, liver, bone, peritoneum, and breast.  Unclear primary tumor possibly lung versus breast.  Patient has never undergone a colonoscopy nor mammogram.  She had her uterus and cervix removed approximately 20 years ago.  History of 50-pack-year history.  Currently smoking 5 cigarettes/day.  As of 11/24, the patient declines chemotherapy and  radiation.    11/25 patient declines biopsy and wanting to discuss chemotherapy and radiation options even if for palliative comfort     Plan  Consult palliative care  Manage pain   Allow patient to have discussion with son of findings, offered mediation of conversation if needed   Will allow patient time to process and if she desires biopsy and discussion with oncology will start that process         DISPO: Inpatient management for pain, further discussions of goals of care and hypercalcemia       CODE STATUS: DNR/DNI     Mackenzie Willis MD  PGY1  [facility]

## 2023-11-25 NOTE — PROGRESS NOTES
Report received. Patient A/Ox 4. VS -150's. Oxygen requirements 2L. Telemetry monitoring SR-ST. Complains of pain 5/10, medicated per MAR. POC discussed with patient. Pt verbalizes understanding. Call light and belongings within reach. Bed locked and lowest position, alarm and fall precautions in place.

## 2023-11-25 NOTE — ASSESSMENT & PLAN NOTE
Patient being evaluated for malignancy.  Concern for primary tumor in lung as the patient has a 50-pack-year history.    Plan:  Continue supplemental oxygen  See #metastatic cancer

## 2023-11-25 NOTE — ASSESSMENT & PLAN NOTE
Likely secondary to malignancy as the patient was found to have metastatic disease on CT scan.  S/p ~2L of LR in the emergency department.  PTH 8.0. With repeat calcium of 12. 11/26 calcium of 11.4     Plan:  No labs as patient is now comfort care measures   She is aware that this will likely trend upwards due to metastatic cancer

## 2023-11-25 NOTE — ASSESSMENT & PLAN NOTE
Present on CBC. Likely due to a combination of malignancy and demargination as the patient has currently received 4 days of steroids.      Plan:  Given patient's goal of comfort measures and requesting labs to not be drawn will no longer monitor

## 2023-11-25 NOTE — H&P
FAMILY MEDICINE HISTORY AND PHYSICAL       PATIENT ID:  NAME:  Ning Maguire  MRN:               [id number]  YOB: 1960    Date of Admission: 11/24/2023     Attending: Navin Denis M.d.     Resident: Fabi Theodore M.D.    Primary Care Physician:  Fabi Theodore M.D.    CC:    Chief Complaint   Patient presents with    Hip Pain     Bilateral hip pain since November 20th. Was seen on UC at this time and prescribed prednisone and hydrocodone for pain.  Worsening pain since. Now unable to walk on her own even when utilizing cane since pain too unbearable.        HPI: Ning Maguire is a 63 y.o. female with no known medical history as she does not have a primary care doctor was admitted on 11/24/2023 for hypercalcemia likely secondary to metastatic cancer with unknown primary tumor site.    The patient had an acute onset of lower back pain on October 12, 2023.  She thought that she had slept weird and as result treated herself with over-the-counter medications and ice packs.  She eventually went to the urgent care and 2 separate visits for which she was provided steroids and pain medicine with no improvement in her symptoms.  Today, her pains are significantly worse and had leg weakness which  prompted her to be evaluated in the emergency department.  She denies incontinence to bowel and bladder.    She says that she does not have a primary care physician.  As such, she has never undergone colonoscopy nor mammogram.  She did have a hysterectomy approximately 20 years ago secondary to abnormal uterine bleeding secondary to fibroids.  She was told that she did not require Pap smears thereafter.  She is a current 5 cigarette a day smoker.  She has a 50-pack-year history of smoking.  She has not completely quit smoking cigarettes as it is allowed her to have distraction from her severe back pain.  She does have a cough for which she attributed to COPD.  Over the last 2 weeks, the cough is changed  to productive with clear and frothy sputum.    The patient clearly stated that as of 2023, she does not want any chemotherapy or radiation.  She is open to talking to an oncologist, however, to see what other treatment options may be available to her.  She also very clearly stated that she wants to be DNR.    ERCourse:  She underwent evaluation for back pain.  She was tachycardic and hypertensive.  She was found to be hypoxic with SpO2 between 88 and 91.  Her lab work returned a calcium of 13.1.  She underwent CT scan which revealed evidence of malignancy.  The patient was provided IV fluid resuscitation.  She was admitted for management of hypercalcemia, pain management, and malignancy workup.    REVIEW OF SYSTEMS:   Ten systems reviewed and were negative except as noted in the HPI.                PAST MEDICAL HISTORY:   has a past medical history of Urinary tract infection, site not specified.     PAST SURGICAL HISTORY:   has no past surgical history on file.     FAMILY HISTORY:  family history is not on file.     SOCIAL HISTORY:   Employment: Employed  Smokin cigarettes a day smoker.  Etoh: 3-4 alcoholic beverages a year.  Recreational Drug: Occasional marijuana gummy for pain.    DIET:   Orders Placed This Encounter   Procedures    Diet Order Diet: Regular     Standing Status:   Standing     Number of Occurrences:   1     Order Specific Question:   Diet:     Answer:   Regular [1]       ALLERGIES:  No Known Allergies    OUTPATIENT MEDICATIONS:    Current Facility-Administered Medications:     oxyCODONE immediate-release (Roxicodone) tablet 5 mg, 5 mg, Oral, Q4HRS PRN **OR** oxyCODONE immediate-release (Roxicodone) tablet 10 mg, 10 mg, Oral, Q4HRS PRN, Fabi Theodore M.D.    HYDROmorphone (Dilaudid) injection 1 mg, 1 mg, Intravenous, Q2HRS PRN, Fabi Theodore M.D.    senna-docusate (Pericolace Or Senokot S) 8.6-50 MG per tablet 2 Tablet, 2 Tablet, Oral, BID **AND** polyethylene glycol/lytes (Miralax)  PACKET 1 Packet, 1 Packet, Oral, QDAY PRN **AND** magnesium hydroxide (Milk Of Magnesia) suspension 30 mL, 30 mL, Oral, QDAY PRN **AND** bisacodyl (Dulcolax) suppository 10 mg, 10 mg, Rectal, QDAY PRN, Fabi Theodore M.D.    Respiratory Therapy Consult, , Nebulization, Continuous RT, Fabi Theodore M.D.    enoxaparin (Lovenox) inj 40 mg, 40 mg, Subcutaneous, DAILY AT 1800, Fabi Theodore M.D.    acetaminophen (Tylenol) tablet 650 mg, 650 mg, Oral, Q6HRS PRN, Fabi Theodore M.D.    ondansetron (Zofran) syringe/vial injection 4 mg, 4 mg, Intravenous, Q4HRS PRN, Fabi Theodore M.D.    ondansetron (Zofran ODT) dispertab 4 mg, 4 mg, Oral, Q4HRS PRN, Fabi Theodore M.D.    labetalol (Normodyne/Trandate) injection 10 mg, 10 mg, Intravenous, Q4HRS PRN, Fabi Theodore M.D.    [START ON 11/25/2023] nicotine (Nicoderm) 14 MG/24HR 14 mg, 14 mg, Transdermal, Daily-0600 **AND** Nicotine Replacement Patient Education Materials, , , Once **AND** nicotine polacrilex (Nicorette) 2 MG piece 2 mg, 2 mg, Oral, Q HOUR PRN, Fabi Theodore M.D.    NS infusion, , Intravenous, Continuous, Fabi Theodore M.D.    Current Outpatient Medications:     predniSONE (DELTASONE) 20 MG Tab, Take 3 tabs daily x 3 days, then 2 tabs daily x 2 days, then 1 tab x 2 days., Disp: 15 Tablet, Rfl: 0    cyclobenzaprine (FLEXERIL) 5 mg tablet, Take 1-2 Tablets by mouth every 8 hours as needed for Muscle Spasms., Disp: 30 Tablet, Rfl: 0    HYDROcodone-acetaminophen (NORCO) 5-325 MG Tab per tablet, Take 1 Tablet by mouth every 8 hours as needed (severe pain) for up to 5 days., Disp: 15 Tablet, Rfl: 0    cyclobenzaprine (FLEXERIL) 5 mg tablet, Take 1-2 Tablets by mouth 3 times a day as needed for Moderate Pain or Muscle Spasms., Disp: 30 Tablet, Rfl: 0    naproxen (NAPROSYN) 500 MG Tab, Take 1 Tablet by mouth 2 times a day as needed (back pain)., Disp: 30 Tablet, Rfl: 0    PHYSICAL EXAM:  Vitals:    11/24/23 1521 11/24/23 1635 11/24/23 1700  "23 1810   BP: 139/79 (!) 144/74 139/65 (!) 155/74   Pulse: (!) 113 (!) 102 97 95   Resp: 19 16 16 17   Temp:       TempSrc:       SpO2: 93% 93% 94% 92%   Weight:       Height:       , Temp (24hrs), Av.1 °C (97 °F), Min:36.1 °C (97 °F), Max:36.1 °C (97 °F)  , Pulse Oximetry: 92 %, O2 (LPM): 2, O2 Delivery Device: Nasal Cannula    Physical Exam  Vitals and nursing note reviewed.   Constitutional:       General: She is not in acute distress.     Appearance: Normal appearance.   Cardiovascular:      Rate and Rhythm: Normal rate and regular rhythm.      Heart sounds: Normal heart sounds. No murmur heard.  Pulmonary:      Effort: Pulmonary effort is normal. No respiratory distress.      Breath sounds: Normal breath sounds.   Abdominal:      General: Bowel sounds are normal. There is no distension.      Palpations: Abdomen is soft.      Tenderness: There is no abdominal tenderness. There is no guarding.   Musculoskeletal:      Cervical back: Normal. No tenderness or bony tenderness.      Thoracic back: Normal. No tenderness or bony tenderness.      Lumbar back: Normal. No tenderness or bony tenderness.   Skin:     General: Skin is warm and dry.   Neurological:      Mental Status: She is alert and oriented to person, place, and time.      Sensory: Sensation is intact.      Motor: Weakness present.      Comments: Weakness with bilateral hip flexion.  She had to use the arm rails to assist in hip flexion.  Pain with hip flexion on left.       LAB TESTS:   Recent Labs     23  1449   WBC 16.0*   RBC 5.04   HEMOGLOBIN 14.8   HEMATOCRIT 43.7   MCV 86.7   MCH 29.4   MCHC 33.9   RDW 43.9   PLATELETCT 354   MPV 9.8     Recent Labs     23  1449   SODIUM 132*   POTASSIUM 4.2   CHLORIDE 96   CO2 23   GLUCOSE 119*   BUN 19   CREATININE 0.85   CALCIUM 13.1*     Recent Labs     23  1449   ALTSGPT 10   ASTSGOT 15   ALKPHOSPHAT 131*   TBILIRUBIN 0.3   GLUCOSE 119*         No results for input(s): \"NTPROBNP\" in " the last 72 hours.      Recent Labs     11/24/23  1636   TROPONINT 9       CULTURES:   Results       Procedure Component Value Units Date/Time    Blood Culture - Draw one set from central line if present [[id number]] Collected: 11/24/23 1449    Order Status: Sent Specimen: Blood from Line Updated: 11/24/23 1613    Narrative:      Blood Cultures X2. Draw one blood culture from central line  (including implanted port) and one blood culture  peripherally. If no central line present draw blood cultures  times two peripherally from different sites  Release to patient->Immediate    Urinalysis [[id number]]     Order Status: Sent Specimen: Urine     Urine Culture (New) [[id number]]     Order Status: Sent Specimen: Urine     Blood Culture - Draw one set from central line if present [[id number]]     Order Status: Sent Specimen: Blood from Peripheral             IMAGES:  CT-LSPINE W/O PLUS RECONS   Final Result      1.  Multiple lytic osseous lesions seen within the lumbar spine, sacrum and incompletely visualized left iliac bone.   2.  Lytic destructive L3 mass involving the posterior right vertebral body, pedicle and posterior elements with right spinal epidural and foraminal tumor extension.   3.  Lumbar dextroscoliosis and mild spondylosis.      CT-TSPINE W/O PLUS RECONS   Final Result      1.  No evidence of fracture or destructive lesion of the thoracic spine.      2.  Again noted right hilar lung mass with a cavitary component.      CT-CHEST,ABDOMEN,PELVIS WITH   Final Result      1.  Right mediastinal and hilar mass in keeping with malignancy, presumably primary lung malignancy as described above.   2.  Ill-defined 1.7 cm hepatic lesion raises suspicion for liver metastasis.   3.  Multiple lytic osseous metastases, including large left iliac lesion, L3 osseous lesion with right-sided epidural extension, and mid sacral metastasis.   4.  1 cm upper anterior abdominal peritoneal lesion is suspicious for peritoneal metastasis.    5.  1.4 cm right breast mass could be a primary tumor or metastatic.   6.  Further evaluation with PET CT may be performed.      DX-CHEST-PORTABLE (1 VIEW)   Final Result      Areas of atelectasis within the right mid to lower lung.        ASSESSMENT/PLAN:   Ning Maguire is a 63 y.o. female with no known medical history as she does not have a primary care doctor was admitted on 11/24/2023 for hypercalcemia likely secondary to metastatic cancer with unknown primary tumor site.    I anticipate this patient will require at least two midnights for appropriate medical management, necessitating inpatient admission because managing pain and electrolytes    Patient will need a Telemetry bed secondary to hypercalcemia.    * Hypercalcemia- (present on admission)  Assessment & Plan  Likely secondary to malignancy as the patient was found to have metastatic disease on CT scan.  S/p ~2L of LR in the emergency department.  PTH pending  Repeat calcium pending. Additional fluids will be ordered based on results of repeat calcium.  Monitor for fluid overload.    Acute hypoxemic respiratory failure (HCC)  Assessment & Plan  Patient being evaluated for malignancy.  Concern for primary tumor in lung as the patient has a 50-pack-year history.  Continue supplemental oxygen  See #metastatic cancer    Leukocytosis  Assessment & Plan  Present on CBC. Likely due to a combination of malignancy and demargination as the patient has currently received 4 days of steroids.  Monitor for fevers.  Discontinue steroids.  Trend CBC.    Metastatic cancer (HCC)  Assessment & Plan  CT abdomen pelvis revealed multiple areas of metastatic cancer in the lung, liver, bone, peritoneum, and breast.  Unclear primary tumor possibly lung versus breast.  Patient has never undergone a colonoscopy nor mammogram.  She had her uterus and cervix removed approximately 20 years ago.  History of 50-pack-year history.  Currently smoking 5 cigarettes/day.  As of  11/24, the patient declines chemotherapy and radiation.  She is willing to discuss possible treatment plans with oncologist.  Oncology consultation in the morning.  Consider palliative consult    Always thirsty  Assessment & Plan  Patient reports being thirsty often, and has never been evaluated for diabetes.  A1c pending.      Core Measures:  Fluids: NS IVF  Lines: IV  Abx: P.o.  Diet: Regular  PPX: enoxaparin ppx    CODE Status: DNAR/DNI

## 2023-11-26 PROBLEM — K21.9 GERD (GASTROESOPHAGEAL REFLUX DISEASE): Status: ACTIVE | Noted: 2023-01-01

## 2023-11-26 NOTE — ASSESSMENT & PLAN NOTE
Patient with GERD overnight at home generally treats with avoiding triggering foods.     Plan:  PPI daily   Pepcid PRN until PPI starts to work  Tums PRN

## 2023-11-26 NOTE — PROGRESS NOTES
"2147 Alert by patient nurse of concern of acid reflux. GI cocktail given with complete resolution of symptoms.    0029 Alerted by patient nurse that symptoms have returned though not as severe. Patient evaluated at bedside. Reports she occasionally has \"heartburn\" at home which she treats with avoidance of dietary triggers without medication. Reiterates that burning sensation in epigastrium (points to area) she is feeling feels like her home reflux symptoms just more severe, though this episode is not as severe as earlier this evening. States it is also associated with hiccups. It is worse with laying down and better with sitting up/eating crackers. It is not exertional. Denies chest pressure, radiation of pain to shoulder/arm/neck/jaw, nausea/vomiting, sweating. No SOB, pain with inspiration, cough, leg swelling or pain.     Epigastrium with mild TTP otherwise exam WNL including heart, lungs, extremities.     Likely reflux given resolution of symptoms with GI cocktail, same symptomology as earlier, aggravating/alleviating factors, and associated symptoms. Low suspicion for cardiac etiology, PE given above and ECG/troponin at admission WNL.     -PPI ordered   -CTM for symptoms, if evolving low threshold for ECG/trop/CXR   "

## 2023-11-26 NOTE — PROGRESS NOTES
Bedside report given by Eveline MCCLELLAND, assumed pt care. Pt sitting up In bed, awake. On 3LNC. Pain is tolerable at the moment per pt. Bed in low and locked position, call light within reach, bed alarm on. Will continue to monitor.

## 2023-11-26 NOTE — PROGRESS NOTES
[facility] MEDICINE PROGRESS NOTE     Attending: Navin Denis M.d.    Resident: Mackenzie Willis M.D.    PATIENT: Ning Maguire; [id number]; 1960    ID: 63 y.o. female admitted for hypercalcemia likely secondary to metastatic cancer with unknown primary tumor.     SUBJECTIVE:   Overnight patient had acid reflux and was given a GI cocktail with resolution of symptoms that then returned later. She was evaluated at bedside with epigastrium tenderness and otherwise benign exam and started on a PPI.     Maegan states she had a discussion with her son last night. She reports it went okay and her son is understanding. We discussed her desires at this point again. She states that she wants her pain and symptoms managed. She still does not want the biopsy and any type of radiation or chemotherapy even if it was for palliative symptom management. She is aware that if she changes her mind we can pursue this option. She reports that if she was unable to make decisions she would want her son Demond Sarmiento to make them and inquired about ways to make this known.     OBJECTIVE:     Vitals:    11/26/23 0323 11/26/23 0705 11/26/23 1034 11/26/23 1036   BP:  136/81     Pulse:  100 (!) 110 98   Resp:  18  18   Temp:  36.1 °C (97 °F)     TempSrc:  Temporal     SpO2:  98% 96% 97%   Weight: 74.9 kg (165 lb 2 oz)      Height:           Intake/Output Summary (Last 24 hours) at 11/26/2023 1111  Last data filed at 11/26/2023 0600  Gross per 24 hour   Intake 1240 ml   Output 4150 ml   Net -2910 ml         PE:   General: No acute distress, resting comfortably in bed.  HEENT: NC/AT. PERRLA. EOMI. MMM  Cardiovascular: Normal S1/S2, RRR, no m/r/g  Respiratory: Symmetric inspiratory effort. CTAB with no adventitious sounds  Abdomen: BS+, soft, nontender and nondistended   EXT:  MARCUS, 5/5 strength, 2+ pulses, no rashes, bruising, or bleeding. No tenderness to palpation of lower back   Neuro: Non focal with no numbness, tingling or changes  "in sensation, AxOx4   Psych: pleasant mood with appropriate tearful response when talking about her diagnosis    LABS:  Recent Labs     11/24/23  1449 11/25/23 0138 11/26/23 0149   WBC 16.0* 15.4* 11.3*   RBC 5.04 5.01 4.46   HEMOGLOBIN 14.8 14.6 12.8   HEMATOCRIT 43.7 44.5 40.2   MCV 86.7 88.8 90.1   MCH 29.4 29.1 28.7   RDW 43.9 45.2 46.0   PLATELETCT 354 275 235   MPV 9.8 10.3 9.6   NEUTSPOLYS 78.60* 62.20 62.00   LYMPHOCYTES 13.60* 28.10 26.80   MONOCYTES 6.60 8.20 8.60   EOSINOPHILS 0.00 0.30 1.50   BASOPHILS 0.10 0.30 0.20     Recent Labs     11/24/23  1449 11/25/23 0019 11/26/23 0149   SODIUM 132* 134* 136   POTASSIUM 4.2 4.6 3.9   CHLORIDE 96 97 100   CO2 23 27 25   BUN 19 17 14   CREATININE 0.85 0.92 0.86   CALCIUM 13.1* 12.1* 11.1*   MAGNESIUM 1.8  --   --    PHOSPHORUS 2.6  --   --    ALBUMIN 4.7 4.1 3.6     Estimated GFR/CRCL = Estimated Creatinine Clearance: 64.7 mL/min (by C-G formula based on SCr of 0.86 mg/dL).  Recent Labs     11/24/23  1449 11/25/23 0019 11/26/23 0149   GLUCOSE 119* 125* 112*     Recent Labs     11/24/23  1449 11/25/23 0019 11/26/23 0149   ASTSGOT 15 18 17   ALTSGPT 10 11 11   TBILIRUBIN 0.3 0.3 0.3   ALKPHOSPHAT 131* 119* 108*   GLOBULIN 3.2 2.8 2.7             No results for input(s): \"INR\", \"APTT\", \"FIBRINOGEN\" in the last 72 hours.    Invalid input(s): \"DIMER\"    MICROBIOLOGY:   Results       Procedure Component Value Units Date/Time    Blood Culture - Draw one set from central line if present [[id number]] Collected: 11/24/23 1449    Order Status: Completed Specimen: Blood from Line Updated: 11/25/23 0701     Significant Indicator NEG     Source BLD     Site PERIPHERAL     Culture Result No Growth  Note: Blood cultures are incubated for 5 days and  are monitored continuously.Positive blood cultures  are called to the RN and reported as soon as  they are identified.      Narrative:      Blood Cultures X2. Draw one blood culture from central line  (including implanted " port) and one blood culture  peripherally. If no central line present draw blood cultures  times two peripherally from different sites  Release to patient->Immediate  Right AC    Urinalysis [[id number]]     Order Status: Sent Specimen: Urine     Urine Culture (New) [[id number]]     Order Status: Sent Specimen: Urine     Blood Culture - Draw one set from central line if present [[id number]]     Order Status: Sent Specimen: Blood from Peripheral               IMAGING:   CT-LSPINE W/O PLUS RECONS   Final Result      1.  Multiple lytic osseous lesions seen within the lumbar spine, sacrum and incompletely visualized left iliac bone.   2.  Lytic destructive L3 mass involving the posterior right vertebral body, pedicle and posterior elements with right spinal epidural and foraminal tumor extension.   3.  Lumbar dextroscoliosis and mild spondylosis.      CT-TSPINE W/O PLUS RECONS   Final Result      1.  No evidence of fracture or destructive lesion of the thoracic spine.      2.  Again noted right hilar lung mass with a cavitary component.      CT-CHEST,ABDOMEN,PELVIS WITH   Final Result      1.  Right mediastinal and hilar mass in keeping with malignancy, presumably primary lung malignancy as described above.   2.  Ill-defined 1.7 cm hepatic lesion raises suspicion for liver metastasis.   3.  Multiple lytic osseous metastases, including large left iliac lesion, L3 osseous lesion with right-sided epidural extension, and mid sacral metastasis.   4.  1 cm upper anterior abdominal peritoneal lesion is suspicious for peritoneal metastasis.   5.  1.4 cm right breast mass could be a primary tumor or metastatic.   6.  Further evaluation with PET CT may be performed.      DX-CHEST-PORTABLE (1 VIEW)   Final Result      Areas of atelectasis within the right mid to lower lung.          MEDS:  Current Facility-Administered Medications   Medication Last Admin    omeprazole (PriLOSEC) capsule 20 mg 20 mg at 11/26/23 0419    guaifenesin  dextromethorphan sugar free (DIABETIC TUSSIN DM)  MG/5ML soln 10 mL 10 mL at 11/26/23 0945    calcium carbonate (Tums) chewable tab 500 mg      famotidine (Pepcid) tablet 20 mg 20 mg at 11/26/23 0422    oxyCODONE immediate-release (Roxicodone) tablet 5 mg 5 mg at 11/25/23 0056    Or    oxyCODONE immediate release (Roxicodone) tablet 10 mg 10 mg at 11/26/23 0944    HYDROmorphone (Dilaudid) injection 1 mg 1 mg at 11/25/23 2208    senna-docusate (Pericolace Or Senokot S) 8.6-50 MG per tablet 2 Tablet 2 Tablet at 11/26/23 0419    And    polyethylene glycol/lytes (Miralax) PACKET 1 Packet      And    magnesium hydroxide (Milk Of Magnesia) suspension 30 mL      And    bisacodyl (Dulcolax) suppository 10 mg      Respiratory Therapy Consult      enoxaparin (Lovenox) inj 40 mg 40 mg at 11/25/23 1634    acetaminophen (Tylenol) tablet 650 mg      ondansetron (Zofran) syringe/vial injection 4 mg      ondansetron (Zofran ODT) dispertab 4 mg 4 mg at 11/25/23 2056    labetalol (Normodyne/Trandate) injection 10 mg      nicotine (Nicoderm) 14 MG/24HR 14 mg 14 mg at 11/26/23 0419    And    nicotine polacrilex (Nicorette) 2 MG piece 2 mg      NS infusion New Bag at 11/26/23 0341       ASSESSMENT/PLAN: Ning Maguire is a 63 y.o. female admitted for hypercalcemia likely 2/2 metastatic cancer of unknown primary tumor site.    * Hypercalcemia- (present on admission)  Assessment & Plan  Likely secondary to malignancy as the patient was found to have metastatic disease on CT scan.  S/p ~2L of LR in the emergency department.  PTH 8.0. With repeat calcium of 12. 11/26 calcium of 11.4     Plan:  NS maintenance fluids 150 ml/hr   Repeat CMP in morning   Monitor for symptoms   Monitor for fluid overload.    GERD (gastroesophageal reflux disease)  Assessment & Plan  Patient with GERD overnight at home generally treats with avoiding triggering foods.     Plan:  PPI daily   Pepcid PRN   Tums PRN     Acute hypoxemic respiratory failure  (HCC)  Assessment & Plan  Patient being evaluated for malignancy.  Concern for primary tumor in lung as the patient has a 50-pack-year history.    Plan:  Continue supplemental oxygen  See #metastatic cancer    Leukocytosis  Assessment & Plan  Present on CBC. Likely due to a combination of malignancy and demargination as the patient has currently received 4 days of steroids.      Plan:  Monitor for fevers.  Discontinue steroids.  Trend CBC.    Always thirsty  Assessment & Plan  Patient reports being thirsty often, and has never been evaluated for diabetes.  A1c 6.2, pre diabetic     Plan:  Continue to monitor for symptoms see if it relieved with IV hydration     Metastatic cancer (HCC)  Assessment & Plan  CT abdomen pelvis revealed multiple areas of metastatic cancer in the lung, liver, bone, peritoneum, and breast.  Unclear primary tumor possibly lung versus breast.  Patient has never undergone a colonoscopy nor mammogram.  She had her uterus and cervix removed approximately 20 years ago.  History of 50-pack-year history.  Currently smoking 5 cigarettes/day.  As of 11/24, the patient declines chemotherapy and radiation.    11/25 patient declines biopsy and wanting to discuss chemotherapy and radiation options even if for palliative comfort     Plan  Consult palliative care  Manage pain   Consider biopsy with oncology consult if patient starts to express desire to pursue this route   Discussion with patient about starting POLST due to her desire to make her wishes better known, can complete when patient is ready   PT/OT ordered due to pain in lower back likely caused by lytic lesions         DISPO: Inpatient management for pain, further discussions of goals of care and hypercalcemia       CODE STATUS: DNR/DNI     Mackenzie Willis MD  PGY1  [facility]

## 2023-11-26 NOTE — CARE PLAN
The patient is Stable - Low risk of patient condition declining or worsening    Shift Goals  Clinical Goals: Pain control, safety  Patient Goals: Pain control  Family Goals: SUMA    Progress made toward(s) clinical / shift goals:      Problem: Pain - Standard  Goal: Alleviation of pain or a reduction in pain to the patient’s comfort goal  Description: Target End Date:  Prior to discharge or change in level of care    Document on Vitals flowsheet    1.  Document pain using the appropriate pain scale per order or unit policy  2.  Educate and implement non-pharmacologic comfort measures (i.e. relaxation, distraction, massage, cold/heat therapy, etc.)  3.  Pain management medications as ordered  4.  Reassess pain after pain med administration per policy  5.  If opiods administered assess patient's response to pain medication is appropriate per POSS sedation scale  6.  Follow pain management plan developed in collaboration with patient and interdisciplinary team (including palliative care or pain specialists if applicable)  Outcome: Progressing     Problem: Knowledge Deficit - Standard  Goal: Patient and family/care givers will demonstrate understanding of plan of care, disease process/condition, diagnostic tests and medications  Description: Target End Date:  1-3 days or as soon as patient condition allows    Document in Patient Education    1.  Patient and family/caregiver oriented to unit, equipment, visitation policy and means for communicating concern  2.  Complete/review Learning Assessment  3.  Assess knowledge level of disease process/condition, treatment plan, diagnostic tests and medications  4.  Explain disease process/condition, treatment plan, diagnostic tests and medications  Outcome: Progressing     Problem: Fall Risk  Goal: Patient will remain free from falls  Description: Target End Date:  Prior to discharge or change in level of care    Document interventions on the [facility] Fall Risk  Assessment    1.  Assess for fall risk factors  2.  Implement fall precautions  Outcome: Progressing     Problem: Depression  Goal: Patient and family/caregiver will verbalize accurate information about at least two of the possible causes of depression, three-four of the signs and symptoms of depression  Description: Target End Date:  1 to 3 days    1.  Assess the patient's and family/caregiver's knowledge regarding depression and its causes.  2.  Explain to the patient and family/caregiver regarding the major symptoms of depression.  3.  Inform the patient and family/caregiver that depression can be treated through medications and psychotherapy.  4.  Allow the patient to express feelings and perceptions  5.  Express hope to the patient with realistic comments about the patient's strengths and resources.  5.  Give positive feedback after a tasks are achieved.  6.  Encourage identification of positive aspects of self.  7.  Educate the patient about crisis intervention services such as suicide hotlines and other resources.  Outcome: Progressing

## 2023-11-26 NOTE — CARE PLAN
The patient is Watcher - Medium risk of patient condition declining or worsening    Shift Goals  Clinical Goals: pain control, safety, mobility,  Patient Goals: pain control    Progress made toward(s) clinical / shift goals:     Problem: Pain - Standard  Goal: Alleviation of pain or a reduction in pain to the patient’s comfort goal  Outcome: Progressing  Pain assessed using the 0-10 pain scale. Pt experiencing 7-9/10 pain in in back and hips. Pt educated on non-pharmacological interventions including repositioning, distraction and heat/cold packs.      Problem: Fall Risk  Goal: Patient will remain free from falls  Outcome: Progressing  Note: Pt educated on importance of calling nurse before getting up. Fall precautions in place. Bed locked in lowest position. Call light and belongings within reach. Wristband and proper sign placed. Bed alarm on. No skid socks applied.

## 2023-11-26 NOTE — PROGRESS NOTES
Assumed care of patient and received report from RN. Tele monitor in place and patient in SR/ST. VS stable. A&Ox4. Pain 8/10 and medicated per MAR. POC discussed with patient and verbalized understanding. Call light in reach. Fall precautions in place. Bed locked in lowest position. Bed alarm on.

## 2023-11-27 NOTE — THERAPY
"Occupational Therapy   Initial Evaluation     Patient Name: Ning Maguire  Age:  63 y.o., Sex:  female  Medical Record #: [id number]  Today's Date: 2023       Precautions: Fall Risk    Assessment  Patient is 63 y.o. female admitted for hypercalcemia likely secondary to metastatic cancer with unknown primary tumor. Pt has c/o back & bilateral hip pain.  Pt educated on energy conservation techniques during ADL's in her home environment.  Discussed AE needs for home use to maintain her independence & safety while showering.  Pt reports she doesn't like to eat in the kitchen but prefers to take the food upstairs to her bedroom.  Discussed various strategies to minimize the amount of times she has to go up/down the stairs in any given day.  Pt did say her son is very helpful but he does work.  Pt may benefit from HH therapy however at this time she stated that she didn't feel it was necessary for her.  Pt is currently able to complete basic ADL's & functional mobility with supervision & extra time with rest breaks.  Patient will not be actively followed for occupational therapy services at this time, however may be seen if requested by physician for 1 more visit within 30 days to address any discharge or equipment needs.     Plan    Occupational Therapy Initial Treatment Plan   Duration: Discharge Needs Only    DC Equipment Recommendations: Tub Transfer Bench, Grab Bar(s) in Tub / Shower  Discharge Recommendations: Recommend home health for continued occupational therapy services (pt stated she didn't feel HH therapy was necessary)     Subjective    \"My  was disabled before he  so I'm familiar with the equipment you're talking about.\"     Objective      Initial Contact Note    Initial Contact Note Order Received and Verified, Evaluation Only - Patient Does Not Require Further Acute Occupational Therapy at this Time.  However, May Benefit from Post Acute Therapy for Higher Level Functional Deficits. "   Prior Living Situation   Prior Services Intermittent Physical Support for ADL Per Family   Housing / Facility 2 Story House   Steps Into Home 2   Steps In Home 14   Rail Right Rail  (Steps in Home)   Bathroom Set up Bathtub / Shower Combination;Shower Glass Doors   Equipment Owned Single Point Cane   Lives with - Patient's Self Care Capacity Adult Children   Comments pt reports she lives with her son who works during the day but is helpful & willing to assist when he's home   Prior Level of ADL Function   Self Feeding Independent   Grooming / Hygiene Independent   Bathing Independent   Dressing Independent   Toileting Independent   Comments pt reports that recently it has become more difficult to complete showering in the tub given the glass doors as she has become weaker   Prior Level of IADL Function   Medication Management Independent   Laundry Requires Assist   Kitchen Mobility Independent   Finances Requires Assist   Home Management Requires Assist   Shopping Requires Assist   Prior Level Of Mobility Supervision With Device in Home   Occupation (Pre-Hospital Vocational) Retired Due To Age   Precautions   Precautions Fall Risk   Vitals   O2 Delivery Device None - Room Air   Pain   Pain Scales 0 to 10 Scale    Intervention Ambulation / Increased Activity   Pain 0 - 10 Group   Location Back;Hip   Location Orientation Right;Left   Description Aching;Sore   Therapist Pain Assessment During Activity;Nurse Notified;3   Cognition    Cognition / Consciousness WDL   Level of Consciousness Alert   Comments Pt is pleasant & co-operative, set in her ways on how she prefers to complete her ADL's.  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 & prefers to be alone in her bedroom   Passive ROM Upper Body   Passive ROM Upper Body WDL   Active ROM Upper Body   Active ROM Upper Body  WDL   Dominant Hand Right   Strength Upper Body   Upper Body Strength  WDL   Sensation Upper Body   Upper Extremity Sensation  WDL   Coordination Upper Body    Coordination WDL   Balance Assessment   Sitting Balance (Static) Good   Sitting Balance (Dynamic) Good   Standing Balance (Static) Fair +   Standing Balance (Dynamic) Fair   Weight Shift Sitting Good   Weight Shift Standing Fair   Bed Mobility    Supine to Sit Supervised   Sit to Supine Supervised   Scooting Supervised   Rolling Supervised   ADL Assessment   Eating Modified Independent   Grooming Supervision;Standing   Upper Body Dressing Supervision   Lower Body Dressing Supervision   Toileting Supervision   Comments with extra time   Functional Mobility   Sit to Stand Supervised   Bed, Chair, Wheelchair Transfer Supervised   Toilet Transfers Supervised   Transfer Method Stand Step   Education Group   Education Provided Home Safety;Activities of Daily Living;Adaptive Equipment;Energy Conservation   Role of Occupational Therapist Patient Response Patient;Acceptance;Explanation;Verbal Demonstration   Energy Conservation Patient Response Patient;Acceptance;Explanation;Demonstration;Verbal Demonstration   Home Safety Patient Response Patient;Acceptance;Explanation;Demonstration;Action Demonstration;Verbal Demonstration   ADL Patient Response Patient;Acceptance;Explanation;Demonstration;Verbal Demonstration;Action Demonstration   Adaptive Equipment Patient Response Patient;Acceptance;Explanation;Verbal Demonstration  (tub transfer bench, remove shower glass doors)   Occupational Therapy Initial Treatment Plan    Duration Discharge Needs Only   Anticipated Discharge Equipment and Recommendations   DC Equipment Recommendations Tub Transfer Bench;Grab Bar(s) in Tub / Shower   Discharge Recommendations Recommend home health for continued occupational therapy services  (pt stated she didn't feel HH therapy was necessary)   Interdisciplinary Plan of Care Collaboration   IDT Collaboration with  Nursing;Physical Therapist   Patient Position at End of Therapy In Bed;Call Light within Reach;Tray Table within Reach;Phone within  Reach   Collaboration Comments Nsg notified of OT findings   Session Information   Date / Session Number  11/26 #1 (1/3, 12/2)

## 2023-11-27 NOTE — PROGRESS NOTES
Monitor Summary  Rhythm: Sinus rhythm- Sinus Tach  Rate:   Ectopy: PVC regular  .17 / .06 / .35

## 2023-11-27 NOTE — RESPIRATORY CARE
COPD EDUCATION by COPD CLINICAL EDUCATOR  11/27/2023 at 10:35 AM by Radhika Sims, RRT     Patient evaluated by COPD team for smoking cessation. Patient politely declined smoking cessation due to the new dx of cancer.

## 2023-11-27 NOTE — THERAPY
Physical Therapy   Initial Evaluation     Patient Name: Ning Maguire  Age:  63 y.o., Sex:  female  Medical Record #: [id number]  Today's Date: 11/26/2023     Precautions  Precautions: Fall Risk    Assessment  Patient is 63 y.o. female presenting for increasing low back/B hip pain and difficulty walking, found to have metastatic cancer of unknown origin w/ no known PMH. She lives w/ her adult son who works during the day in a 2SH, and reports she must be able to navigate a FOS (9 steps, landing, 5 steps) to access her bedroom/bathroom.  The pt reports she would be able to place a chair on the landing of her stairs if needed to take a seated rest break.      Currently, the pt demonstrates poor L>R hip and knee strength and AROM, pain w/ active knee extension and hip flexion, reduced balance/gait stability and poor functional endurance, however able to demonstrate functional mobility tasks w/ minimal assistance.  The pt demo'd supine<>sit EOB w/ HOB flat and no rails spv, along w/ STS EOB w/ FWW SBA. She demo'd gait 60' using FWW SBA via antalgic LLE, slow daiana, shuffled and step-to pattern, no LOB observed and distance limited by fatigue.  The pt also demo'd ability to ascend/descend 9 stairs w/ BUE support and SBA.  Provided pt extensive edu re: use of FWW, stair negotiation, bed mobility, and home activity modifications including placement of chair on landing of stairs for seated rest w/ pt understanding verbalized. Recommend pt DC home w/ HH for home safety assessment and further PT needs given limited amount of assistance required for functional mobility tasks at this time, and pt expressing confidence in being able to perform necessary tasks at home.  Will continue to follow for acute PT needs.      Plan    Physical Therapy Initial Treatment Plan   Treatment Plan : Bed Mobility, Equipment, Gait Training, Neuro Re-Education / Balance, Self Care / Home Evaluation, Stair Training, Therapeutic Activities,  Therapeutic Exercise  Treatment Frequency: 4 Times per Week  Duration: Until Therapy Goals Met    DC Equipment Recommendations: Front-Wheel Walker  Discharge Recommendations: Recommend home health for continued physical therapy services       Subjective/Objective       11/26/23 1526   Prior Living Situation   Prior Services Home-Independent   Housing / Facility 2 Story House   Steps Into Home 2   Steps In Home 14  (9 steps, landing, 5 more steps)   Equipment Owned Single Point Cane   Lives with - Patient's Self Care Capacity Adult Children   Comments Pt lives w/ her adult son who works during the day, and she must be able to navigate her FOS to get to her bedroom/bathroom   Prior Level of Functional Mobility   Bed Mobility Independent   Transfer Status Independent   Ambulation Independent   Ambulation Distance Limited community   Assistive Devices Used Single Point Cane   Stairs Independent   Comments Pt reports recent decline in function d/t pain and weakness, however feels she will be able to manage mobility tasks at home w/ her pain better controlled.   History of Falls   History of Falls No   Date of Last Fall   (Pt denies any recent falls)   Cognition    Cognition / Consciousness WDL   Level of Consciousness Alert   Comments pt pleasant and cooperative   Passive ROM Lower Body   Passive ROM Lower Body WDL   Active ROM Lower Body    Active ROM Lower Body  X   Comments L>R knee extension and hip flexion limited by pain and weakness   Strength Lower Body   Lower Body Strength  X   Comments Gross L>R LE weakness   Sensation Lower Body   Lower Extremity Sensation   WDL   Comments Sensation intact to LT/DP BLE   Coordination Lower Body    Coordination Lower Body  WDL   Other Treatments   Other Treatments Provided Extensive edu re: use of FWW, stair negotiation, bed mobility, and home activity modifications including placement of chair on landing of stairs for seated rest   Balance Assessment   Sitting Balance  (Static) Good   Sitting Balance (Dynamic) Good   Standing Balance (Static) Fair +   Standing Balance (Dynamic) Fair   Weight Shift Sitting Good   Weight Shift Standing Fair   Comments stand w/ FWW   Bed Mobility    Supine to Sit Supervised   Sit to Supine Supervised   Scooting Supervised   Comments HOB flat, no rails   Gait Analysis   Gait Level Of Assist Standby Assist   Assistive Device Front Wheel Walker   Distance (Feet) 60   # of Times Distance was Traveled 1   Deviation Antalgic;Step To;Bradykinetic;Shuffled Gait   # of Stairs Climbed 9   Level of Assist with Stairs Standby Assist   Weight Bearing Status no restrictions   Vision Deficits Impacting Mobility pt denies   Comments gait distance limited by fatigue   Functional Mobility   Sit to Stand Standby Assist   Bed, Chair, Wheelchair Transfer Standby Assist   Transfer Method Stand Step   Mobility STS EOB w/ FWW; EOB<>staircase   Activity Tolerance   Sitting Edge of Bed >45min   Standing 18min   Edema / Skin Assessment   Edema / Skin  Not Assessed   Short Term Goals    Short Term Goal # 1 Pt will demo gait >100' spv w/ FWW in a moving environment for household ambulation.   Short Term Goal # 2 Pt will demo ability to negotiate 14 stairs w/ UE support and spv in 6 visits for access to her home environment.   Education Group   Education Provided Role of Physical Therapist;Stair Training;Use of Assistive Device   Role of Physical Therapist Patient Response Patient;Acceptance;Explanation;Demonstration;Action Demonstration   Stair Training Patient Response Patient;Acceptance;Explanation;Demonstration;Verbal Demonstration;Action Demonstration   Use of Assistive Device Patient Response Patient;Acceptance;Explanation;Demonstration;Verbal Demonstration;Action Demonstration   Additional Comments Pt very receptive of edu provided   Problem List    Problems Pain;Impaired Transfers;Impaired Ambulation;Functional ROM Deficit;Functional Strength Deficit;Impaired  Balance;Decreased Activity Tolerance   Interdisciplinary Plan of Care Collaboration   IDT Collaboration with  Nursing   Patient Position at End of Therapy In Bed;Bed Alarm On;Call Light within Reach;Tray Table within Reach;Phone within Reach   Collaboration Comments regarding outcome of tx session   Session Information   Date / Session Number  11/26- 1 (1/4, 12/2)

## 2023-11-27 NOTE — CARE PLAN
Problem: Hyperinflation  Goal: Prevent or improve atelectasis  Description: Target End Date:  3 to 4 days    1. Instruct incentive spirometry usage  2.  Perform hyperinflation therapy as indicated  Outcome: Progressing   PEP QID as tolerated
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
## 2023-11-27 NOTE — PROGRESS NOTES
[facility] MEDICINE PROGRESS NOTE     Attending: Navin Denis M.d.    Resident: Mackenzie Willis M.D.    PATIENT: Ning Maguire; [id number]; 1960    ID: 63 y.o. female admitted for hypercalcemia likely secondary to metastatic cancer with unknown primary tumor.     SUBJECTIVE:   No acute events overnight. Yesterday discussed what a POLST was with patient and allowed her to review the questions present on the form. She requested some time to think about the medical interventions and nutrition and fluids.     Discussed with patient this morning again about her desires. She expressed that she has concerns about costs if she did seek a biopsy and treatment options. She does not want to create a financial burden to her son. She is wanting to have conversations with case management and palliative to get better ideas.    OBJECTIVE:     Vitals:    11/27/23 0300 11/27/23 0735 11/27/23 0800 11/27/23 1138   BP: (!) 151/86  134/76 129/80   Pulse: 98 95 93 (!) 110   Resp: 18 18 18 18   Temp: 37 °C (98.6 °F)   36.4 °C (97.5 °F)   TempSrc: Temporal   Temporal   SpO2: 95%  96% 95%   Weight: 77.9 kg (171 lb 11.8 oz)      Height:           Intake/Output Summary (Last 24 hours) at 11/27/2023 1359  Last data filed at 11/27/2023 1200  Gross per 24 hour   Intake 600 ml   Output 2900 ml   Net -2300 ml         PE:   General: No acute distress, resting comfortably in bed.  HEENT: NC/AT. PERRLA. EOMI. MMM  Cardiovascular: Normal S1/S2, RRR, no m/r/g  Respiratory: Symmetric inspiratory effort. CTAB with no adventitious sounds, cough present  Abdomen: BS+, soft, nontender and nondistended   EXT:  MARCUS, 5/5 strength, 2+ pulses, no rashes, bruising, or bleeding. No tenderness to palpation of lower back   Neuro: Non focal with no numbness, tingling or changes in sensation, AxOx4   Psych: pleasant mood with tearful response when discussing her diagnosis    LABS:  Recent Labs     11/25/23  0138 11/26/23  0149 11/27/23  0120   WBC 15.4* 11.3*  "9.2   RBC 5.01 4.46 4.20   HEMOGLOBIN 14.6 12.8 12.3   HEMATOCRIT 44.5 40.2 37.6   MCV 88.8 90.1 89.5   MCH 29.1 28.7 29.3   RDW 45.2 46.0 44.6   PLATELETCT 275 235 211   MPV 10.3 9.6 9.7   NEUTSPOLYS 62.20 62.00 65.40   LYMPHOCYTES 28.10 26.80 22.90   MONOCYTES 8.20 8.60 8.20   EOSINOPHILS 0.30 1.50 2.50   BASOPHILS 0.30 0.20 0.10     Recent Labs     11/24/23  1449 11/25/23  0019 11/26/23  0149 11/27/23  0120   SODIUM 132* 134* 136 133*   POTASSIUM 4.2 4.6 3.9 3.7   CHLORIDE 96 97 100 100   CO2 23 27 25 27   BUN 19 17 14 9   CREATININE 0.85 0.92 0.86 0.66   CALCIUM 13.1* 12.1* 11.1* 9.7   MAGNESIUM 1.8  --   --   --    PHOSPHORUS 2.6  --   --   --    ALBUMIN 4.7 4.1 3.6 3.2     Estimated GFR/CRCL = Estimated Creatinine Clearance: 85.9 mL/min (by C-G formula based on SCr of 0.66 mg/dL).  Recent Labs     11/25/23  0019 11/26/23 0149 11/27/23  0120   GLUCOSE 125* 112* 126*     Recent Labs     11/25/23  0019 11/26/23  0149 11/27/23  0120   ASTSGOT 18 17 17   ALTSGPT 11 11 10   TBILIRUBIN 0.3 0.3 0.3   ALKPHOSPHAT 119* 108* 98   GLOBULIN 2.8 2.7 2.5             No results for input(s): \"INR\", \"APTT\", \"FIBRINOGEN\" in the last 72 hours.    Invalid input(s): \"DIMER\"    MICROBIOLOGY:   Results       Procedure Component Value Units Date/Time    Blood Culture - Draw one set from central line if present [[id number]] Collected: 11/24/23 1449    Order Status: Completed Specimen: Blood from Line Updated: 11/25/23 0701     Significant Indicator NEG     Source BLD     Site PERIPHERAL     Culture Result No Growth  Note: Blood cultures are incubated for 5 days and  are monitored continuously.Positive blood cultures  are called to the RN and reported as soon as  they are identified.      Narrative:      Blood Cultures X2. Draw one blood culture from central line  (including implanted port) and one blood culture  peripherally. If no central line present draw blood cultures  times two peripherally from different sites  Release to " patient->Immediate  Right AC    Urinalysis [[id number]]     Order Status: Sent Specimen: Urine     Urine Culture (New) [[id number]]     Order Status: Sent Specimen: Urine     Blood Culture - Draw one set from central line if present [[id number]]     Order Status: Sent Specimen: Blood from Peripheral               IMAGING:   CT-LSPINE W/O PLUS RECONS   Final Result      1.  Multiple lytic osseous lesions seen within the lumbar spine, sacrum and incompletely visualized left iliac bone.   2.  Lytic destructive L3 mass involving the posterior right vertebral body, pedicle and posterior elements with right spinal epidural and foraminal tumor extension.   3.  Lumbar dextroscoliosis and mild spondylosis.      CT-TSPINE W/O PLUS RECONS   Final Result      1.  No evidence of fracture or destructive lesion of the thoracic spine.      2.  Again noted right hilar lung mass with a cavitary component.      CT-CHEST,ABDOMEN,PELVIS WITH   Final Result      1.  Right mediastinal and hilar mass in keeping with malignancy, presumably primary lung malignancy as described above.   2.  Ill-defined 1.7 cm hepatic lesion raises suspicion for liver metastasis.   3.  Multiple lytic osseous metastases, including large left iliac lesion, L3 osseous lesion with right-sided epidural extension, and mid sacral metastasis.   4.  1 cm upper anterior abdominal peritoneal lesion is suspicious for peritoneal metastasis.   5.  1.4 cm right breast mass could be a primary tumor or metastatic.   6.  Further evaluation with PET CT may be performed.      DX-CHEST-PORTABLE (1 VIEW)   Final Result      Areas of atelectasis within the right mid to lower lung.          MEDS:  Current Facility-Administered Medications   Medication Last Admin    cyclobenzaprine (Flexeril) tablet 10 mg 10 mg at 11/27/23 1349    omeprazole (PriLOSEC) capsule 20 mg 20 mg at 11/27/23 0602    guaifenesin dextromethorphan sugar free (DIABETIC TUSSIN DM)  MG/5ML soln 10 mL 10 mL at  11/26/23 0945    calcium carbonate (Tums) chewable tab 500 mg      famotidine (Pepcid) tablet 20 mg 20 mg at 11/27/23 0929    oxyCODONE immediate-release (Roxicodone) tablet 5 mg 5 mg at 11/27/23 0929    Or    oxyCODONE immediate release (Roxicodone) tablet 10 mg 10 mg at 11/27/23 0226    HYDROmorphone (Dilaudid) injection 1 mg 1 mg at 11/27/23 0733    senna-docusate (Pericolace Or Senokot S) 8.6-50 MG per tablet 2 Tablet 2 Tablet at 11/27/23 0602    And    polyethylene glycol/lytes (Miralax) PACKET 1 Packet      And    magnesium hydroxide (Milk Of Magnesia) suspension 30 mL 30 mL at 11/26/23 1637    And    bisacodyl (Dulcolax) suppository 10 mg      Respiratory Therapy Consult      enoxaparin (Lovenox) inj 40 mg 40 mg at 11/26/23 1634    acetaminophen (Tylenol) tablet 650 mg 650 mg at 11/27/23 1345    ondansetron (Zofran) syringe/vial injection 4 mg 4 mg at 11/27/23 0734    ondansetron (Zofran ODT) dispertab 4 mg 4 mg at 11/25/23 2056    labetalol (Normodyne/Trandate) injection 10 mg      nicotine (Nicoderm) 14 MG/24HR 14 mg 14 mg at 11/27/23 0602    And    nicotine polacrilex (Nicorette) 2 MG piece 2 mg         ASSESSMENT/PLAN: Ning Maguire is a 63 y.o. female admitted for hypercalcemia likely 2/2 metastatic cancer of unknown primary tumor site.    * Hypercalcemia- (present on admission)  Assessment & Plan  Likely secondary to malignancy as the patient was found to have metastatic disease on CT scan.  S/p ~2L of LR in the emergency department.  PTH 8.0. With repeat calcium of 12. 11/26 calcium of 11.4     Plan:  Discontinued NS maintenance fluids 150 ml/hr   Repeat CMP in morning   Monitor for symptoms   Monitor for fluid overload.    GERD (gastroesophageal reflux disease)  Assessment & Plan  Patient with GERD overnight at home generally treats with avoiding triggering foods.     Plan:  PPI daily   Pepcid PRN   Tums PRN     Acute hypoxemic respiratory failure (HCC)  Assessment & Plan  Patient being evaluated  for malignancy.  Concern for primary tumor in lung as the patient has a 50-pack-year history.    Plan:  Continue supplemental oxygen  See #metastatic cancer    Leukocytosis  Assessment & Plan  Present on CBC. Likely due to a combination of malignancy and demargination as the patient has currently received 4 days of steroids.      Plan:  Monitor for fevers.  Discontinue steroids.  Trend CBC.    Always thirsty  Assessment & Plan  Patient reports being thirsty often, and has never been evaluated for diabetes.  A1c 6.2, pre diabetic     Plan:  Continue to monitor for symptoms see if it relieved with IV hydration     Metastatic cancer (HCC)  Assessment & Plan  CT abdomen pelvis revealed multiple areas of metastatic cancer in the lung, liver, bone, peritoneum, and breast.  Unclear primary tumor possibly lung versus breast.  Patient has never undergone a colonoscopy nor mammogram.  She had her uterus and cervix removed approximately 20 years ago.  History of 50-pack-year history.  Currently smoking 5 cigarettes/day.  As of 11/24, the patient declines chemotherapy and radiation.    11/25 patient declines biopsy and wanting to discuss chemotherapy and radiation options even if for palliative comfort     Plan  Consult palliative care  Manage pain   Consider biopsy with oncology consult if patient starts to express desire to pursue this route   Patient is concerned about costs of treatment will have further discussion with case management  Discussion with patient about starting POLST due to her desire to make her wishes better known, can complete when patient is ready   PT/OT ordered due to pain in lower back likely caused by lytic lesions  Recommending home health at this time         DISPO: Inpatient management for pain, further discussions of goals of care and hypercalcemia       CODE STATUS: DNR/DNI     Mackenzie Willis MD  PGY1  [facility]

## 2023-11-27 NOTE — CONSULTS
"MRN: [id number]  Date of palliative consult: 11/27/23  Reason for consult: Other \"Recent imaging suggestive of cancer with metastasis. Patient does not want to pursue biopsy, radiation or chemo even if it would be palliative in nature. Wants symptom management and advance care planning discussions\"  Referring provider: Dr. Mackenzie Willis [facility]  Location of consult: [place] [id number]  Additional consulting services: None    HPI:   Ning Maguire is a 63 y.o. female with past medical history significant for hysterectomy secondary to abnormal uterine bleeding due to fibroids, current smoker with chronic cough, does not have a primary care doctor routine cancer screenings admitted 11/24/2023 for back and bilateral hip pain.  Patient had acute onset of back pain 10/12/2023 only had 2 visits at urgent care.  She was provided steroids and pain medicine without improvement in her symptoms.  Her pain has progressed to bilateral hips with some leg weakness without incontinence of bowel or bladder prompting her to come to the emergency department.  On admission patient noted to be hypercalcemic with leukocytosis.  CT imaging of spine, chest, abdomen, and pelvis notable for significant multiple lytic osseous lesions within the lumbar spine, sacrum, left iliac bone, lytic destructive L3 mass, right hilar lung mass with cavitary component, and right breast mass.    Pain History:  Onset: Months  Location: Spine/hips  Duration: Constant  Characteristics: Sharp, pressure  Aggravating factors: Ambulating, moving  Alleviating factors: Dilaudid gives little relief but only short-term  Radiation: Radicular pain bilateral lower extremities left greater than right  Treatments: Patient has been receiving oxycodone and IV Dilaudid as needed frequently  Severity: Best 5/10, worst 8/10    Additional symptoms: Reflux, dyspnea with cough, constipation, insomnia    Medication Allergy/Sensitivities:  No Known Allergies    ROS:    Review of " Systems   Constitutional:  Positive for weight loss. Negative for malaise/fatigue.   Respiratory:  Positive for cough, sputum production and shortness of breath.    Gastrointestinal:  Positive for constipation (last bowel movement ). Negative for abdominal pain.   Musculoskeletal:  Positive for back pain and joint pain.   Neurological:  Positive for sensory change (radicular pain BLE L>R).   Psychiatric/Behavioral:  Negative for depression and substance abuse. The patient has insomnia. The patient is not nervous/anxious.      PE:   Recent vital signs  BMI: Body mass index is 29.46 kg/m².    Temp (24hrs), Av.6 °C (97.8 °F), Min:36.2 °C (97.2 °F), Max:37 °C (98.6 °F)  Temperature: 37 °C (98.6 °F), Monitored Temp: 36.4 °C (97.5 °F)  Pulse  Av.2  Min: 87  Max: 124   Blood Pressure : 134/76       Physical Exam  Pulmonary:      Breath sounds: No decreased air movement.   Abdominal:      General: Bowel sounds are decreased. There is distension.      Tenderness: There is no abdominal tenderness.       Recent Labs     23  0019 23  0149 23  0120   SODIUM 134* 136 133*   POTASSIUM 4.6 3.9 3.7   CHLORIDE 97 100 100   CO2    GLUCOSE 125* 112* 126*   BUN 17 14 9   CREATININE 0.92 0.86 0.66   CALCIUM 12.1* 11.1* 9.7     Recent Labs     23  0138 239 23  0120   WBC 15.4* 11.3* 9.2   RBC 5.01 4.46 4.20   HEMOGLOBIN 14.6 12.8 12.3   HEMATOCRIT 44.5 40.2 37.6   MCV 88.8 90.1 89.5   MCH 29.1 28.7 29.3   MCHC 32.8 31.8* 32.7   RDW 45.2 46.0 44.6   PLATELETCT 275 235 211   MPV 10.3 9.6 9.7     ASSESSMENT/PLAN WITH SHARED DECISION MAKING:   Medications reviewed. Labs Reviewed.   Pertinent imaging reviewed.    PHYSICAL ASPECTS OF CARE  Palliative Performance Scale: 50%    #Widely metastatic cancer unknown primary  - patient declining biopsy/work-up as she does not want cancer treatment  - she wishes to move forward with hospice  #Cancer related bone pain due to osseous  metastatic lesions   MEDD ~ 80-90 mg   - start long acting opioid therapy with MS ER 15 mg PO Q 12 hours  - continue oxycodone/hydromorphone for breakthrough pain with plan to transition likely to morphine for brekthrough pain  - start dexamethasone 4 mg PO BID with breakfast/lunch to avoid GI irritation and nocturnal dosing  - start gabapentin 100 mg PO HS for neuropathic/radicular pain; nocturnal dosing to promote rest  #Hiccups   - DC cyclobenzaprine PRN  - start baclofen 5 mg PO BID  #Constipation  Last bowel movement 11/20  Give lactulose 30 mL x 1 now  Add Miralax 1 packet daily PO scheduled in addition to scheduled senna/docusate  Patient got one dose of Milk of Mag 11/26 with out BM  #Dyspnea and cough  - short course steroids for pain may help  - patient requested benzonatate ordered by primary team  #Heartburn  - GI cocktail effective  - continue PPI  - Pepcid PRN until PPI therapeutic    SOCIAL ASPECTS OF CARE  Smokes 5 cigarettes/day with 50-pack-year smoking history.  Patient and her son live together in a several story house.  Patient is .  Her ex- and his wife are available to provide emotional support to patient's son.  She works as an 's for a .  She does not have access to FMLA.  She likely will quit her job.  She denies needing work excuse letter.  Confirm that her son may want to apply for intermittent FMLA.  Discussed that I can provide work excuse letter and or FMLA assistance as can hospice.    SPIRITUAL ASPECTS OF CARE   Not particularly Christianity but is spiritual.  She stated the closest thing to Church she would except is [employer].  She declined need for spiritual care visit.    GOALS OF CARE/SERIOUS ILLNESS CONVERSATION  Introduced myself to patient and discussed role of palliative care and reason for consult/visit.  Patient agreeable to discuss goals of care.  She confirms she does not wish to pursue biopsy or cancer work-up she  "does not feel it will help her and prognosis.  She does not want cancer related treatment for likely widespread metastatic disease.  She wants to focus on her comfort and quality of life given her time may be limited.  She wishes to be at home with her son and not around strangers in the hospital.  She has been given information about hospice care and is pending choice as she wants to discuss this with her son.    I provided hospice choice form.  Provided extensive overview of hospice care.  Recommended POLST form.  Completed with patient for DNR/comfort focused treatment, no artificial nutrition, no IV fluids.  She inquired about completion of POA paperwork/advanced directive.  Confirmed our team can assist.  At this time patient's primary team entered and discussed treatment plan.  They will make sure patient does not have lab work or vital signs at night.  Patient okay with continuation of oxygen monitoring and cardiac monitoring while she is in the hospital.  She would like more rest at night.  Primary team confirmed they will adjust orders.  At this time PT arrived.  Confirmed we can continue discussion later and I will follow-up on symptom management tomorrow.    Code Status: DNR/DNI    ACP Documents: None    30 minutes spent discussing advance care planning, this time excludes any other billed services.    I spent a total of 95 minutes reviewing medical records, direct face-to-face time with the patient and/or family, documentation and coordination of care. This is separate from the time spent on advance care planning, which is documented above.    Kena \"Nazia\" MATHEW Corey, [facility]  Palliative Care Nurse Practitioner  [phone]      "

## 2023-11-27 NOTE — PROGRESS NOTES
Assumed care of patient and received report from RN. Tele monitor in place and patient in SR/ST. VS stable. A&Ox 4. Pain 6/10 and medicated per MAR. POC discussed with patient and verbalized understanding. Call light in reach. Fall precautions in place. Bed locked in lowest position. Bed alarm on. No skid socks applied.

## 2023-11-27 NOTE — CARE PLAN
The patient is Stable - Low risk of patient condition declining or worsening    Shift Goals  Clinical Goals: pain control, safety, mobility  Patient Goals: sleep  Family Goals: SUMA    Progress made toward(s) clinical / shift goals:     Problem: Pain - Standard  Goal: Alleviation of pain or a reduction in pain to the patient’s comfort goal  Outcome: Progressing  Note: Pain assessed using the 0-10 pain scale. Pt experiencing 8-10/10 pain in in back and hips. Pt educated on non-pharmacological interventions including repositioning, distraction and heat/cold packs.      Problem: Fall Risk  Goal: Patient will remain free from falls  Outcome: Progressing  Note: Pt educated on importance of calling nurse before getting up. Fall precautions in place. Bed locked in lowest position. Call light and belongings within reach. Wristband and proper sign placed. Bed alarm on. No skid socks applied.

## 2023-11-28 PROBLEM — K59.00 CONSTIPATION: Status: ACTIVE | Noted: 2023-01-01

## 2023-11-28 NOTE — PROGRESS NOTES
Monitor Summary     Rhythm: ST  Heart Rate: 102-113  Ectopy: O PAC, R PVC  Measurement: .14/.05/.28

## 2023-11-28 NOTE — PROGRESS NOTES
"MRN: [id number]  Date of palliative consult: 11/27/23  Reason for consult: Other \"Recent imaging suggestive of cancer with metastasis. Patient does not want to pursue biopsy, radiation or chemo even if it would be palliative in nature. Wants symptom management and advance care planning discussions\"  Referring provider: Dr. Mackenzie Willis [facility]  Location of consult: [place] [address]  Additional consulting services: None    HPI:   Ning Maguire is a 63 y.o. female with past medical history significant for hysterectomy secondary to abnormal uterine bleeding due to fibroids, current smoker with chronic cough, does not have a primary care doctor routine cancer screenings admitted 11/24/2023 for back and bilateral hip pain.  Patient had acute onset of back pain 10/12/2023 only had 2 visits at urgent care.  She was provided steroids and pain medicine without improvement in her symptoms.  Her pain has progressed to bilateral hips with some leg weakness without incontinence of bowel or bladder prompting her to come to the emergency department.  On admission patient noted to be hypercalcemic with leukocytosis.  CT imaging of spine, chest, abdomen, and pelvis notable for significant multiple lytic osseous lesions within the lumbar spine, sacrum, left iliac bone, lytic destructive L3 mass, right hilar lung mass with cavitary component, and right breast mass.    Pain History:  Onset: Months  Location: Spine/hips  Duration: Constant  Characteristics: Sharp, pressure  Aggravating factors: Ambulating, moving  Alleviating factors: Dilaudid gives little relief but only short-term  Radiation: Radicular pain bilateral lower extremities left greater than right  Treatments: Patient has been receiving oxycodone and IV Dilaudid as needed frequently  Severity: Best 5/10, worst 8/10    Additional symptoms: Reflux, dyspnea with cough, constipation, insomnia    Interval history:  11/28 pain well managed.  Currently patient " "experiencing 2/10 severity with less severe spikes.  Pain continues to intensify when she gets up out of bed.  She is a light sleeper and did not sleep well again last night due to staff checking on her \"but it is okay.\"  She would like to keep her hospice care \"in house\" with [facility].  She selected alternates.  Case management to send choice.  ROS:    Review of Systems   Constitutional:  Positive for weight loss. Negative for malaise/fatigue.   Respiratory:  Positive for cough, sputum production and shortness of breath (improved).    Gastrointestinal:  Negative for constipation (two bowel movements this morning).   Musculoskeletal:  Positive for back pain and joint pain.   Neurological:  Positive for sensory change (radicular pain BLE L>R).     PE:   Recent vital signs  BMI: Body mass index is 28.18 kg/m².    Temp (24hrs), Av.4 °C (97.6 °F), Min:36.3 °C (97.3 °F), Max:36.6 °C (97.9 °F)  Temperature: 36.5 °C (97.7 °F)  Pulse  Av.6  Min: 87  Max: 124   Blood Pressure : (!) 130/93 (RN notified )       Physical Exam  Pulmonary:      Effort: Pulmonary effort is normal.      Breath sounds: No decreased air movement.   Abdominal:      General: Bowel sounds are decreased. There is no distension.      Tenderness: There is no abdominal tenderness.   Neurological:      Mental Status: She is alert.     ASSESSMENT/PLAN WITH SHARED DECISION MAKING:   PHYSICAL ASPECTS OF CARE  Palliative Performance Scale: 50%    #Widely metastatic cancer unknown primary    #Cancer related bone pain due to osseous metastatic lesions   MEDD    ~ 80-90 mg    ~  mg  - continue MS ER 15 mg PO Q 12 hours; consider up-titration.Confirm that her son may want to apply for intermittent FMLA.  Discussed that I can provide work excuse letter and or FMLA assistance as can hospice. She is planning to talk with her boss regarding resignation and would like to stay mentally clear as possible for a while. She also wants to drive \"a few " "more times.\" Discussed she cannot drive while on opioids for safety and will need to plan accordingly.   - discontinue oxycodone, start MS IR 7.5-15 mg PO Q 4 hours PRN breakthrough pain  - dexamethasone 4 mg PO BID with breakfast/lunch   - gabapentin 100 mg PO HS  #Hiccups   - baclofen 5 mg PO BID  #Constipation  - Senna-docusate 2 tabs PO BID  - MiraLAX 1 packet daily PO daily  - required lactulose 11/27 following 7 days with out BM  - PRN milk of magnesia and dulcolax suppository available.   #Dyspnea and cough  - short course steroids for pain may help  -  benzonatate and guaifenesin PRN  #Heartburn   - GI cocktail effective 11/26  - continue PPI  - Pepcid PRN until PPI therapeutic    SOCIAL ASPECTS OF CARE  Smokes 5 cigarettes/day with 50-pack-year smoking history.  Patient and her son live together in a several story house.  Patient is .  Her ex- and his wife are available to provide emotional support to patient's son.  She works as an 's for a .  She does not have access to [place].  She likely will quit her job.  She denies needing work excuse letter.      SPIRITUAL ASPECTS OF CARE   Not particularly Muslim but is spiritual.  She stated the closest thing to [facility] she would except is [facility].  She declined need for spiritual care visit.    GOALS OF CARE/SERIOUS ILLNESS CONVERSATION  Patient completed choice form for hospice; care coordination to send.  Patient wants aggressive symptom management.  she denies other needs at this time.    Code Status: DNR/DNI    ACP Documents: None; POLST completed    I spent a total of 50 minutes reviewing medical records, direct face-to-face time with the patient and/or family, documentation and coordination of care.     Kena \"Nazia\" MATHEW Corey, [facility]-BC  Palliative Care Nurse Practitioner  [phone]      "

## 2023-11-28 NOTE — CARE PLAN
The patient is Stable - Low risk of patient condition declining or worsening    Shift Goals  Clinical Goals: pain control, BM, stable VS  Patient Goals: BM  Family Goals: SUMA    Progress made toward(s) clinical / shift goals:    Problem: Pain - Standard  Goal: Alleviation of pain or a reduction in pain to the patient’s comfort goal  Description: Target End Date:  Prior to discharge or change in level of care    Document on Vitals flowsheet    1.  Document pain using the appropriate pain scale per order or unit policy  2.  Educate and implement non-pharmacologic comfort measures (i.e. relaxation, distraction, massage, cold/heat therapy, etc.)  3.  Pain management medications as ordered  4.  Reassess pain after pain med administration per policy  5.  If opiods administered assess patient's response to pain medication is appropriate per POSS sedation scale  6.  Follow pain management plan developed in collaboration with patient and interdisciplinary team (including palliative care or pain specialists if applicable)  Outcome: Progressing  Note: Pt in 8/10 pain in mid back, R and L hip, and mid abdomen. See MAR for implemented pharmacological treatment. Pt educated on non-pharmacological intervention: repositioning and dimming lights.      Problem: Fall Risk  Goal: Patient will remain free from falls  Description: Target End Date:  Prior to discharge or change in level of care    Document interventions on the Cohen Marcial Fall Risk Assessment    1.  Assess for fall risk factors  2.  Implement fall precautions  Outcome: Progressing  Note: Pt is a high fall risk. Bed is in lowest locked position, call lights and belongings within reach. Bed alarm is on, upper bed rails in place. FWW available. Pt utilizes call light appropriately prior to any ambulation.

## 2023-11-28 NOTE — CARE PLAN
Problem: Hyperinflation  Goal: Prevent or improve atelectasis  Description: Target End Date:  3 to 4 days    1. Instruct incentive spirometry usage  2.  Perform hyperinflation therapy as indicated  Outcome: Not Met

## 2023-11-28 NOTE — PROGRESS NOTES
[facility] MEDICINE PROGRESS NOTE     Attending: Navin Denis M.d.    Resident: Mackenzie Willis M.D.    PATIENT: Ning Maguire; [id number]; 1960    ID: 63 y.o. female admitted for hypercalcemia likely secondary to metastatic cancer with unknown primary tumor.     SUBJECTIVE:   No acute events overnight. Patient completed POLST with palliative yesterday and decided that she wanted to pursue hospice options. This morning patient was deciding on hospice companies and picked [facility]. Still awaiting resources from hospice. She is also stating pain seems to be better managed.    OBJECTIVE:     Vitals:    11/28/23 0443 11/28/23 0700 11/28/23 0859 11/28/23 1146   BP: (!) 155/93  (!) 130/93 123/85   Pulse: (!) 111 (!) 105 100 99   Resp: 20 18 20 18   Temp: 36.3 °C (97.3 °F)  36.5 °C (97.7 °F) 36.5 °C (97.7 °F)   TempSrc: Temporal  Temporal Temporal   SpO2: 94% 93% 94% 90%   Weight: 74.5 kg (164 lb 3.9 oz)      Height:           Intake/Output Summary (Last 24 hours) at 11/28/2023 1530  Last data filed at 11/28/2023 1105  Gross per 24 hour   Intake 340 ml   Output 1450 ml   Net -1110 ml         PE:   General: No acute distress, resting comfortably in bed.  HEENT: NC/AT. PERRLA. EOMI. MMM  Cardiovascular: Normal S1/S2, RRR, no m/r/g  Respiratory: Symmetric inspiratory effort. CTAB with no adventitious sounds, cough present   Abdomen: BS decreased, soft, nontender and nondistended   EXT:  MARCUS, 5/5 strength, 2+ pulses, no rashes, bruising, or bleeding. No tenderness to palpation of lower back   Neuro: Non focal with no numbness, tingling or changes in sensation, AxOx4   Psych: pleasant mood with tearful response when discussing her diagnosis    LABS:  Recent Labs     11/26/23  0149 11/27/23  0120   WBC 11.3* 9.2   RBC 4.46 4.20   HEMOGLOBIN 12.8 12.3   HEMATOCRIT 40.2 37.6   MCV 90.1 89.5   MCH 28.7 29.3   RDW 46.0 44.6   PLATELETCT 235 211   MPV 9.6 9.7   NEUTSPOLYS 62.00 65.40   LYMPHOCYTES 26.80 22.90  "  MONOCYTES 8.60 8.20   EOSINOPHILS 1.50 2.50   BASOPHILS 0.20 0.10     Recent Labs     11/26/23  0149 11/27/23  0120   SODIUM 136 133*   POTASSIUM 3.9 3.7   CHLORIDE 100 100   CO2 25 27   BUN 14 9   CREATININE 0.86 0.66   CALCIUM 11.1* 9.7   ALBUMIN 3.6 3.2     Estimated GFR/CRCL = Estimated Creatinine Clearance: 84 mL/min (by C-G formula based on SCr of 0.66 mg/dL).  Recent Labs     11/26/23 0149 11/27/23  0120   GLUCOSE 112* 126*     Recent Labs     11/26/23 0149 11/27/23  0120   ASTSGOT 17 17   ALTSGPT 11 10   TBILIRUBIN 0.3 0.3   ALKPHOSPHAT 108* 98   GLOBULIN 2.7 2.5             No results for input(s): \"INR\", \"APTT\", \"FIBRINOGEN\" in the last 72 hours.    Invalid input(s): \"DIMER\"    MICROBIOLOGY:   Results       Procedure Component Value Units Date/Time    Blood Culture - Draw one set from central line if present [[id number]] Collected: 11/24/23 1449    Order Status: Completed Specimen: Blood from Line Updated: 11/25/23 0701     Significant Indicator NEG     Source BLD     Site PERIPHERAL     Culture Result No Growth  Note: Blood cultures are incubated for 5 days and  are monitored continuously.Positive blood cultures  are called to the RN and reported as soon as  they are identified.      Narrative:      Blood Cultures X2. Draw one blood culture from central line  (including implanted port) and one blood culture  peripherally. If no central line present draw blood cultures  times two peripherally from different sites  Release to patient->Immediate  Right AC    Urinalysis [[id number]]     Order Status: Canceled Specimen: Urine     Urine Culture (New) [[id number]]     Order Status: Canceled Specimen: Urine     Blood Culture - Draw one set from central line if present [[id number]]     Order Status: Canceled Specimen: Blood from Peripheral               IMAGING:   CT-LSPINE W/O PLUS RECONS   Final Result      1.  Multiple lytic osseous lesions seen within the lumbar spine, sacrum and incompletely visualized left " iliac bone.   2.  Lytic destructive L3 mass involving the posterior right vertebral body, pedicle and posterior elements with right spinal epidural and foraminal tumor extension.   3.  Lumbar dextroscoliosis and mild spondylosis.      CT-TSPINE W/O PLUS RECONS   Final Result      1.  No evidence of fracture or destructive lesion of the thoracic spine.      2.  Again noted right hilar lung mass with a cavitary component.      CT-CHEST,ABDOMEN,PELVIS WITH   Final Result      1.  Right mediastinal and hilar mass in keeping with malignancy, presumably primary lung malignancy as described above.   2.  Ill-defined 1.7 cm hepatic lesion raises suspicion for liver metastasis.   3.  Multiple lytic osseous metastases, including large left iliac lesion, L3 osseous lesion with right-sided epidural extension, and mid sacral metastasis.   4.  1 cm upper anterior abdominal peritoneal lesion is suspicious for peritoneal metastasis.   5.  1.4 cm right breast mass could be a primary tumor or metastatic.   6.  Further evaluation with PET CT may be performed.      DX-CHEST-PORTABLE (1 VIEW)   Final Result      Areas of atelectasis within the right mid to lower lung.          MEDS:  Current Facility-Administered Medications   Medication Last Admin    morphine (MS IR) tablet 7.5 mg 7.5 mg at 11/28/23 1243    Or    morphine (MS IR) tablet 15 mg      benzonatate (Tessalon) capsule 100 mg      dexamethasone (Decadron) injection 4 mg 4 mg at 11/28/23 1244    morphine ER (Ms Contin) tablet 15 mg 15 mg at 11/28/23 0522    baclofen (Lioresal) tablet 5 mg 5 mg at 11/28/23 0522    gabapentin (Neurontin) capsule 100 mg 100 mg at 11/27/23 2203    nicotine (Nicoderm) 14 MG/24HR 14 mg 14 mg at 11/28/23 0739    And    nicotine polacrilex (Nicorette) 2 MG piece 2 mg      senna-docusate (Pericolace Or Senokot S) 8.6-50 MG per tablet 2 Tablet 2 Tablet at 11/27/23 1726    And    polyethylene glycol/lytes (Miralax) PACKET 1 Packet 1 Packet at 11/27/23 1728     And    magnesium hydroxide (Milk Of Magnesia) suspension 30 mL      And    bisacodyl (Dulcolax) suppository 10 mg      omeprazole (PriLOSEC) capsule 20 mg 20 mg at 11/28/23 0522    guaifenesin dextromethorphan sugar free (DIABETIC TUSSIN DM)  MG/5ML soln 10 mL 10 mL at 11/26/23 0945    calcium carbonate (Tums) chewable tab 500 mg      famotidine (Pepcid) tablet 20 mg 20 mg at 11/27/23 1935    HYDROmorphone (Dilaudid) injection 1 mg 1 mg at 11/28/23 1016    Respiratory Therapy Consult      enoxaparin (Lovenox) inj 40 mg 40 mg at 11/27/23 1727    acetaminophen (Tylenol) tablet 650 mg 650 mg at 11/28/23 0739    ondansetron (Zofran) syringe/vial injection 4 mg 4 mg at 11/27/23 0734    ondansetron (Zofran ODT) dispertab 4 mg 4 mg at 11/25/23 2056    labetalol (Normodyne/Trandate) injection 10 mg         ASSESSMENT/PLAN: Ning Maguire is a 63 y.o. female admitted for hypercalcemia likely 2/2 metastatic cancer of unknown primary tumor site.    * Hypercalcemia- (present on admission)  Assessment & Plan  Likely secondary to malignancy as the patient was found to have metastatic disease on CT scan.  S/p ~2L of LR in the emergency department.  PTH 8.0. With repeat calcium of 12. 11/26 calcium of 11.4     Plan:  No labs as patient is now comfort care measures   She is aware that this will likely trend upwards due to metastatic cancer     Constipation  Assessment & Plan  Senna-docusate 2 tabs PO BID  MiraLAX 1 packet daily PO daily  required lactulose 11/27 following 7 days with out BM  PRN milk of magnesia and dulcolax suppository available.    GERD (gastroesophageal reflux disease)  Assessment & Plan  Patient with GERD overnight at home generally treats with avoiding triggering foods.     Plan:  PPI daily   Pepcid PRN until PPI starts to work  Tums PRN     Acute hypoxemic respiratory failure (HCC)  Assessment & Plan  Patient being evaluated for malignancy.  Concern for primary tumor in lung as the patient has a  50-pack-year history.    Plan:  Continue supplemental oxygen  See #metastatic cancer    Leukocytosis  Assessment & Plan  Present on CBC. Likely due to a combination of malignancy and demargination as the patient has currently received 4 days of steroids.      Plan:  Given patient's goal of comfort measures and requesting labs to not be drawn will no longer monitor     Always thirsty  Assessment & Plan  Patient reports being thirsty often, and has never been evaluated for diabetes.  A1c 6.2, pre diabetic     Plan:  Patient does not want IV fluids   Will encourage oral intake     Metastatic cancer (HCC)  Assessment & Plan  CT abdomen pelvis revealed multiple areas of metastatic cancer in the lung, liver, bone, peritoneum, and breast.  Unclear primary tumor possibly lung versus breast.  Patient has never undergone a colonoscopy nor mammogram.  She had her uterus and cervix removed approximately 20 years ago.  History of 50-pack-year history.  Currently smoking 5 cigarettes/day.  As of 11/24, the patient declines chemotherapy and radiation.    11/25 patient declines biopsy and wanting to discuss chemotherapy and radiation options even if for palliative comfort     Plan  Consult palliative care   POLST completed   Baclofen for hiccups  Dexamethasone for bone pain   MS contin 15 mg q12h   Gabapentin 100mg  Oxycodone and dilaudid for breakthrough pain   After discussions with primary team, palliative and case management patient wishes to pursue hospice options  Plan to go home with [facility], in process of setting up   Manage pain   PT/OT ordered due to pain in lower back likely caused by lytic lesions        DISPO: Inpatient management for pain, plan for home with hospice     CODE STATUS: DNR/DNI     Mackenzie Willis MD  PGY1  [facility]

## 2023-11-28 NOTE — THERAPY
Physical Therapy   Daily Treatment     Patient Name: Ning Maguire  Age:  63 y.o., Sex:  female  Medical Record #: [id number]  Today's Date: 11/27/2023     Precautions  Precautions: Fall Risk    Assessment    Pt motivated for PT tx session, continues to be limited by pain, decreased activity tolerance, and generalized weakness.  She mobilized as detailed below with CGA-SBA using FWW for ambulation and SPC for stair negotiation.  Further mobility limited by onset of R thigh pain after stairs.  Will continue to follow.    Plan    Treatment Plan Status: Continue Current Treatment Plan  Type of Treatment: Bed Mobility, Equipment, Gait Training, Neuro Re-Education / Balance, Self Care / Home Evaluation, Stair Training, Therapeutic Activities, Therapeutic Exercise  Treatment Frequency: 4 Times per Week  Treatment Duration: Until Therapy Goals Met    DC Equipment Recommendations: Front-Wheel Walker  Discharge Recommendations: Recommend home health for continued physical therapy services     Objective     11/27/23 1556   Precautions   Precautions Fall Risk   Pain 0 - 10 Group   Therapist Pain Assessment Nurse Notified;During Activity (Not quantified)   Cognition    Cognition / Consciousness WDL   Level of Consciousness Alert   Comments Very pleasant & motivated, receptive   Other Treatments   Other Treatments Provided Pt able to change soiled underwear and put on new pair without assistance.   Balance   Sitting Balance (Static) Good   Sitting Balance (Dynamic) Good   Standing Balance (Static) Fair   Standing Balance (Dynamic) Fair -   Weight Shift Sitting Good   Weight Shift Standing Fair   Skilled Intervention Verbal Cuing   Bed Mobility    Comments NT, up in chair pre & post session   Gait Analysis   Gait Level Of Assist Standby Assist (CGA to walk back to room after stairs 2/2 pain)   Assistive Device Front Wheel Walker   Distance (Feet) 50   # of Times Distance was Traveled 2   Deviation Antalgic;Step To   # of  "Stairs Climbed 3   Level of Assist with Stairs Standby Assist   Weight Bearing Status No restrictions   Skilled Intervention Verbal Cuing;Compensatory Strategies   Comments cues for \"up with the good, down with the bad\" and sequencing cane   Functional Mobility   Sit to Stand Standby Assist   Bed, Chair, Wheelchair Transfer Standby Assist   Transfer Method Stand Step   Mobility amb in contreras, stairs   Skilled Intervention Verbal Cuing   Activity Tolerance   Sitting in Chair Pre & post session   Sitting Edge of Bed 5 min   Standing 8 min   Short Term Goals    Short Term Goal # 1 Pt will demo gait >100' spv w/ FWW in a moving environment for household ambulation.   Goal Outcome # 1 Progressing as expected   Short Term Goal # 2 Pt will demo ability to negotiate 14 stairs w/ UE support and spv in 6 visits for access to her home environment.   Goal Outcome # 2 Progressing as expected   Physical Therapy Treatment Plan   Physical Therapy Treatment Plan Continue Current Treatment Plan     "

## 2023-11-28 NOTE — PROGRESS NOTES
Bedside report received from day RN, pt care assumed, assessment completed. Pt is A&Ox4, declines pain at this time, ST on the monitor. Complaints of heartburn -- given PRN Pepcid. Updated on POC, questions answered. Bed in lowest, locked position, treaded socks on, call light and belongings within reach.

## 2023-11-28 NOTE — PROGRESS NOTES
Report received from night RN at 0700. PT seen at bedside and pt care assumed. Pt is A&Ox4 on RA, states pain 3/10. PIV flushed and intact. PT is SR/ST on telemetry monitor. PT is up standby w/ FWW.     Plan of care reviewed with pt, call light, phone, and personal belongings within reach. Bed alarm on, and bed in low locked position. All pt's needs met at this time.

## 2023-11-28 NOTE — ASSESSMENT & PLAN NOTE
Senna-docusate 2 tabs PO BID  MiraLAX 1 packet daily PO daily  required lactulose 11/27 following 7 days with out BM  PRN milk of magnesia and dulcolax suppository available.

## 2023-11-29 PROBLEM — J96.01 ACUTE HYPOXEMIC RESPIRATORY FAILURE (HCC): Status: RESOLVED | Noted: 2023-01-01 | Resolved: 2023-01-01

## 2023-11-29 PROBLEM — D72.829 LEUKOCYTOSIS: Status: RESOLVED | Noted: 2023-01-01 | Resolved: 2023-01-01

## 2023-11-29 PROBLEM — E83.52 HYPERCALCEMIA: Status: RESOLVED | Noted: 2023-01-01 | Resolved: 2023-01-01

## 2023-11-29 PROBLEM — K59.00 CONSTIPATION: Status: RESOLVED | Noted: 2023-01-01 | Resolved: 2023-01-01

## 2023-11-29 PROBLEM — R63.1 ALWAYS THIRSTY: Status: RESOLVED | Noted: 2023-01-01 | Resolved: 2023-01-01

## 2023-11-29 NOTE — CARE PLAN
The patient is Stable - Low risk of patient condition declining or worsening    Shift Goals  Clinical Goals: pain management, skin breakdown prevention, VS stable  Patient Goals: Rest, pain control  Family Goals: SUMA    Progress made toward(s) clinical / shift goals:    Problem: Pain - Standard  Goal: Alleviation of pain or a reduction in pain to the patient’s comfort goal  Description: Target End Date:  Prior to discharge or change in level of care    Document on Vitals flowsheet    1.  Document pain using the appropriate pain scale per order or unit policy  2.  Educate and implement non-pharmacologic comfort measures (i.e. relaxation, distraction, massage, cold/heat therapy, etc.)  3.  Pain management medications as ordered  4.  Reassess pain after pain med administration per policy  5.  If opiods administered assess patient's response to pain medication is appropriate per POSS sedation scale  6.  Follow pain management plan developed in collaboration with patient and interdisciplinary team (including palliative care or pain specialists if applicable)  Outcome: Progressing  Note: Pt complaints of severe pain in R and L hip and mid back. See MAR for implemented pharmacological intervention. Pt educated on non-pharmacological intervention: dimming the lights and repositioning.     Problem: Fall Risk  Goal: Patient will remain free from falls  Description: Target End Date:  Prior to discharge or change in level of care    Document interventions on the Cohen Marcial Fall Risk Assessment    1.  Assess for fall risk factors  2.  Implement fall precautions  Outcome: Progressing  Note: Pt is a moderate fall risk. Pt is a standby assist with a FWW, with a shuffle gait. Bed is in lowest, locked position. Call light and belongings within reach, bed alarm on.

## 2023-11-29 NOTE — HOSPICE
[facility] referral/consult response    Is this patient accepted to [facility]?:Yes  What hospice level of care?:Community  Approved by provider:Dr. Key    Anticipated DC date: 11/29/23  DC Barriers:DME delivery  Additional Information:    Spoke to patient and family about hospice.   Everyone agreeable.     DME ordered for delivery in am.     Son to bring oxygen to hospital and he will drive her home at noon.     Consents signed    Kathryn bruno

## 2023-11-29 NOTE — CARE PLAN
Problem: Hyperinflation  Goal: Prevent or improve atelectasis  Description: Target End Date:  3 to 4 days    1. Instruct incentive spirometry usage  2.  Perform hyperinflation therapy as indicated  Outcome: Progressing        Respiratory Update  Treatment Modality: PEP  Frequency: BID  -1000    Pt tolerating current treatments well with no adverse reactions, will continue to monitor

## 2023-11-29 NOTE — HOSPICE
Patient is ready to leave with [facility].   DME to arrive to patients house shortly.   Medication ordered for delivery today.     Patient has no concerns at this time.   [facility] to follow when she arrives home.   Please call  with any questions or concerns

## 2023-11-29 NOTE — PROGRESS NOTES
Patient being discharged. Patient educated on discharge instructions and new prescriptions. Patient verbalized understanding. Follow up appt made with hospice rn PIV removed, telemetry monitor checked in. Patient going home with son. RN to call transport to escort out. Will monitor until patient is off unit.

## 2023-11-29 NOTE — PROGRESS NOTES
Bedside report received from day RN, pt care assumed, assessment completed. Pt is A&Ox4, pain 3/10, ST on the monitor, on 4L NC satting low 90s. Updated on POC, questions answered. Bed in lowest, locked position, treaded socks on, call light and belongings within reach.

## 2023-11-29 NOTE — CARE PLAN
Problem: Pain - Standard  Goal: Alleviation of pain or a reduction in pain to the patient’s comfort goal  Outcome: Progressing     Problem: Knowledge Deficit - Standard  Goal: Patient and family/care givers will demonstrate understanding of plan of care, disease process/condition, diagnostic tests and medications  Outcome: Progressing     Problem: Fall Risk  Goal: Patient will remain free from falls  Outcome: Progressing   The patient is Stable - Low risk of patient condition declining or worsening    Shift Goals  Clinical Goals: Remain free of falls, pain control, no skin breakdown  Patient Goals: BM, rest amd comfort  Family Goals: SUMA    Progress made toward(s) clinical / shift goals:      Pt educated on plan of care, pt verbalizes understanding, reinforcement needed. Pt educated on pain/anxiety scales, alleviating factors, pain/anxiety medications, and side effects, and need for pain/anxiety reassessment, pt pain/anxiety controlled at this time, reinforcement needed. Pt educated on the need to reposition frequently and remain dry to avoid skin breakdown, reinforcement needed, no further skin breakdown during shift. Fall risk education provided to pt, pt educated about the need to call for assistance while attempting to ambulate, reinforcement needed, pt remains free of falls this shift.

## 2023-11-29 NOTE — PROGRESS NOTES
12 hour chart check    Rhythm: SR/ST  Rate: 100-116  Ectopy: (R) PVC  Measurements: .18/.07/.20

## 2023-11-29 NOTE — DISCHARGE SUMMARY
[facility] Discharge Summary    Attending: Navin Denis M.d.  Senior Resident: Dr. Galloway   Intern:  Dr. Willis       CHIEF COMPLAINT ON ADMISSION  Chief Complaint   Patient presents with    Hip Pain     Bilateral hip pain since November 20th. Was seen on UC at this time and prescribed prednisone and hydrocodone for pain.  Worsening pain since. Now unable to walk on her own even when utilizing cane since pain too unbearable.        Reason for Admission  SOB     Admission Date  11/24/2023    CODE STATUS  DNAR/DNI    HPI & HOSPITAL COURSE  This is a 63 y.o. female here with no known medical history as she does not have a primary care doctor was admitted on 11/24/2023 for hypercalcemia likely secondary to metastatic cancer with unknown primary tumor site.    In the ED, patient was tachycardic and hypertensive. She was found to be hypoxic with SpO2 between 88-91 and placed on oxygen. Her lab work was notable for calcium of 13.1. She had CT scan which revealed evidence of malignancy. She was provided IV fluid resuscitation.     During her admission we had discussions of goals of care. With IV fluids her calcium returned to normal limits and was stopped. Her pain was managed with a regimen. Patient during discussions with primary team and with palliative continued to state that she did not want to have a biopsy and pursue treatments even if they were for palliative comfort. She was adamant that the side effects from chemotherapy and radiation outweighed any benefits they might bring. She also was not interested in knowing if biologics could be used. Her goals were clear that she wanted her pain and symptoms managed. Therefore, hospice was pursued and POLST was completed.     Therefore, she is discharged in good and stable condition to hospice.    The patient met 2-midnight criteria for an inpatient stay at the time of discharge.    Discharge Date  11/29/23    Physical Exam on Day of Discharge  Physical  Exam  Constitutional:       Appearance: Normal appearance.   HENT:      Head: Normocephalic.      Mouth/Throat:      Mouth: Mucous membranes are moist.   Eyes:      Extraocular Movements: Extraocular movements intact.      Conjunctiva/sclera: Conjunctivae normal.   Cardiovascular:      Rate and Rhythm: Normal rate and regular rhythm.      Pulses: Normal pulses.      Heart sounds: Normal heart sounds.   Pulmonary:      Effort: Pulmonary effort is normal. No respiratory distress.      Breath sounds: Normal breath sounds.      Comments: Nasal canula in place  Abdominal:      General: Abdomen is flat. Bowel sounds are normal. There is no distension.      Palpations: Abdomen is soft.      Tenderness: There is no abdominal tenderness.   Musculoskeletal:         General: Normal range of motion.      Cervical back: Normal range of motion.   Skin:     General: Skin is warm.      Capillary Refill: Capillary refill takes less than 2 seconds.   Neurological:      Mental Status: She is alert and oriented to person, place, and time. Mental status is at baseline.   Psychiatric:         Mood and Affect: Mood normal.         FOLLOW UP ITEMS POST DISCHARGE  None     DISCHARGE DIAGNOSES  Principal Problem:    Hypercalcemia (POA: Yes)  Active Problems:    Metastatic cancer (HCC) (POA: Unknown)    Always thirsty (POA: Unknown)    Leukocytosis (POA: Unknown)    Acute hypoxemic respiratory failure (HCC) (POA: Unknown)    GERD (gastroesophageal reflux disease) (POA: Unknown)    Constipation (POA: Unknown)  Resolved Problems:    * No resolved hospital problems. *      FOLLOW UP  Future Appointments   Date Time Provider Department Center   11/29/2023  1:00 PM Ines Wills R.N. [facility] None     No follow-up provider specified.    MEDICATIONS ON DISCHARGE     Medication List        ASK your doctor about these medications        Instructions   * cyclobenzaprine 5 mg tablet  Commonly known as: Flexeril   Take 1-2 Tablets by mouth 3 times a day  as needed for Moderate Pain or Muscle Spasms.  Dose: 5-10 mg     * cyclobenzaprine 5 mg tablet  Commonly known as: Flexeril   Take 1-2 Tablets by mouth every 8 hours as needed for Muscle Spasms.  Dose: 5-10 mg     HYDROcodone-acetaminophen 5-325 MG Tabs per tablet  Commonly known as: Norco  Ask about: Should I take this medication?   Take 1 Tablet by mouth every 8 hours as needed (severe pain) for up to 5 days.  Dose: 1 Tablet     naproxen 500 MG Tabs  Commonly known as: Naprosyn   Take 1 Tablet by mouth 2 times a day as needed (back pain).  Dose: 500 mg     predniSONE 20 MG Tabs  Commonly known as: Deltasone   Take 3 tabs daily x 3 days, then 2 tabs daily x 2 days, then 1 tab x 2 days.           * This list has 2 medication(s) that are the same as other medications prescribed for you. Read the directions carefully, and ask your doctor or other care provider to review them with you.                  Allergies  No Known Allergies    DIET  Orders Placed This Encounter   Procedures    Diet Order Diet: Regular     Standing Status:   Standing     Number of Occurrences:   1     Order Specific Question:   Diet:     Answer:   Regular [1]       ACTIVITY  As tolerated.  Weight bearing as tolerated    CONSULTATIONS  Palliative     PROCEDURES  None     LABORATORY  Lab Results   Component Value Date    SODIUM 133 (L) 11/27/2023    POTASSIUM 3.7 11/27/2023    CHLORIDE 100 11/27/2023    CO2 27 11/27/2023    GLUCOSE 126 (H) 11/27/2023    BUN 9 11/27/2023    CREATININE 0.66 11/27/2023        Lab Results   Component Value Date    WBC 9.2 11/27/2023    HEMOGLOBIN 12.3 11/27/2023    HEMATOCRIT 37.6 11/27/2023    PLATELETCT 211 11/27/2023

## 2023-11-29 NOTE — PROGRESS NOTES
"MRN: [id number]  Date of palliative consult: 11/27/23  Reason for consult: Other \"Recent imaging suggestive of cancer with metastasis. Patient does not want to pursue biopsy, radiation or chemo even if it would be palliative in nature. Wants symptom management and advance care planning discussions\"  Referring provider: Dr. Mackenzie Willis [facility]  Location of consult: [place] [id number]  Additional consulting services: None    HPI:   Ning Maguire is a 63 y.o. female with past medical history significant for hysterectomy secondary to abnormal uterine bleeding due to fibroids, current smoker with chronic cough, does not have a primary care doctor routine cancer screenings admitted 11/24/2023 for back and bilateral hip pain.  Patient had acute onset of back pain 10/12/2023 only had 2 visits at urgent care.  She was provided steroids and pain medicine without improvement in her symptoms.  Her pain has progressed to bilateral hips with some leg weakness without incontinence of bowel or bladder prompting her to come to the emergency department.  On admission patient noted to be hypercalcemic with leukocytosis.  CT imaging of spine, chest, abdomen, and pelvis notable for significant multiple lytic osseous lesions within the lumbar spine, sacrum, left iliac bone, lytic destructive L3 mass, right hilar lung mass with cavitary component, and right breast mass.    Pain History:  Onset: Months  Location: Spine/hips  Duration: Constant  Characteristics: Sharp, pressure  Aggravating factors: Ambulating, moving  Alleviating factors: Dilaudid gives little relief but only short-term  Radiation: Radicular pain bilateral lower extremities left greater than right  Treatments: Patient has been receiving oxycodone and IV Dilaudid as needed frequently  Severity: Best 5/10, worst 8/10    Additional symptoms: Reflux, dyspnea with cough, constipation, insomnia    Interval history:  11/28 pain well managed.  Currently patient " "experiencing 2/10 severity with less severe spikes.  Pain continues to intensify when she gets up out of bed.  She is a light sleeper and did not sleep well again last night due to staff checking on her \"but it is okay.\"  She would like to keep her hospice care \"in house\" with [facility].  She selected alternates.  Case management to send choice.     NV overnight, attributes to short acting morphine. No bowel movements after 3 bowel movements yesterday. [facility] today.  ROS:    Review of Systems   Constitutional:  Negative for malaise/fatigue.   Respiratory:  Positive for shortness of breath (improved).    Gastrointestinal:  Positive for nausea and vomiting. Negative for constipation (two bowel movements this morning).   Musculoskeletal:  Positive for back pain and joint pain.   Neurological:  Positive for sensory change (radicular pain).     PE:   Recent vital signs  BMI: Body mass index is 27.08 kg/m².    Temp (24hrs), Av.4 °C (97.5 °F), Min:36.2 °C (97.2 °F), Max:36.5 °C (97.7 °F)  Temperature: 36.4 °C (97.5 °F)  Pulse  Av  Min: 87  Max: 124   Blood Pressure : 129/77       Physical Exam  Pulmonary:      Effort: Pulmonary effort is normal.      Breath sounds: No decreased air movement.   Abdominal:      General: Bowel sounds are decreased. There is distension.      Tenderness: There is no abdominal tenderness.   Neurological:      General: No focal deficit present.      Mental Status: She is alert and oriented to person, place, and time. Mental status is at baseline.       ASSESSMENT/PLAN WITH SHARED DECISION MAKING:   PHYSICAL ASPECTS OF CARE  Palliative Performance Scale: 50%    #Widely metastatic cancer unknown primary  Enrolling in hospice care; plan for dc today with [facility]  #Cancer related bone pain due to osseous metastatic lesions   MEDD    ~ 80-90 mg    ~  mg   ~ 113-123 mg  Recommendations discussed with hospice physician/RN  - increase MS ER to 30 mg PO Q " "12 hours;- discontinue oxycodone, start MS - DC MS IR  - restart oxycodone 5-10 mg PO Q 4 hours PRN  - dexamethasone 4 mg PO BID with breakfast/lunch   - gabapentin 100 mg PO HS  #Nausea/vomiting  - patient attributes to short acting morphine; change back to oxycodone  #Hiccups   - baclofen 5 mg PO BID  #Constipation  - Senna-docusate 2 tabs PO BID  - MiraLAX 1 packet daily PO daily  - required lactulose 11/27 following 7 days with out BM  - PRN milk of magnesia and dulcolax suppository available.   #Dyspnea and cough  - short course steroids for pain may help  -  benzonatate and guaifenesin PRN  #Heartburn   - GI cocktail effective 11/26  - continue PPI  - Pepcid PRN until PPI therapeutic    SOCIAL ASPECTS OF CARE  Smokes 5 cigarettes/day with 50-pack-year smoking history.  Patient and her son live together in a several story house.  Patient is .  Her ex- and his wife are available to provide emotional support to patient's son.  She works as an 's for a .  She does not have access to [place].  She likely will quit her job.  She denies needing work excuse letter.      SPIRITUAL ASPECTS OF CARE   Not particularly Restorationism but is spiritual.  She stated the closest thing to Latter-day she would except is Jainism.  She declined need for spiritual care visit.    GOALS OF CARE/SERIOUS ILLNESS CONVERSATION  Patient completed choice form for hospice; care coordination to send.  Patient wants aggressive symptom management.  she denies other needs at this time.    Code Status: DNR/DNI    ACP Documents: None; POLST completed    I spent a total of 35 minutes reviewing medical records, direct face-to-face time with the patient and/or family, documentation and coordination of care.     Kena \"Nazia\" MATHEW Corey, [facility]  Palliative Care Nurse Practitioner  [phone]      "

## 2023-12-14 NOTE — PROGRESS NOTES
Patient discussed with Aliyah Zapata, RNCM. Patient having bladder spasms. No overt symptoms of UTI, afebrile, clear yellow urine per RN. New order for pyridium TID PRN.

## 2024-01-01 ENCOUNTER — PHARMACY VISIT (OUTPATIENT)
Dept: PHARMACY | Facility: MEDICAL CENTER | Age: 64
End: 2024-01-01
Payer: COMMERCIAL

## 2024-01-01 ENCOUNTER — HOME CARE VISIT (OUTPATIENT)
Dept: HOSPICE | Facility: HOSPICE | Age: 64
End: 2024-01-01
Payer: MEDICARE

## 2024-01-01 VITALS — HEART RATE: 110 BPM | RESPIRATION RATE: 20 BRPM

## 2024-01-01 VITALS — RESPIRATION RATE: 18 BRPM | HEART RATE: 100 BPM

## 2024-01-01 VITALS — RESPIRATION RATE: 22 BRPM | HEART RATE: 100 BPM

## 2024-01-01 VITALS — HEART RATE: 110 BPM | RESPIRATION RATE: 24 BRPM

## 2024-01-01 DIAGNOSIS — G89.3 CANCER ASSOCIATED PAIN: ICD-10-CM

## 2024-01-01 DIAGNOSIS — C79.9 METASTATIC MALIGNANT NEOPLASM, UNSPECIFIED SITE (HCC): ICD-10-CM

## 2024-01-01 DIAGNOSIS — Z51.5 HOSPICE CARE: ICD-10-CM

## 2024-01-01 PROCEDURE — S9126 HOSPICE CARE, IN THE HOME, P: HCPCS

## 2024-01-01 PROCEDURE — G0299 HHS/HOSPICE OF RN EA 15 MIN: HCPCS

## 2024-01-01 PROCEDURE — RXMED WILLOW AMBULATORY MEDICATION CHARGE: Performed by: STUDENT IN AN ORGANIZED HEALTH CARE EDUCATION/TRAINING PROGRAM

## 2024-01-01 PROCEDURE — G0156 HHCP-SVS OF AIDE,EA 15 MIN: HCPCS

## 2024-01-01 RX ORDER — MORPHINE SULFATE 60 MG/1
60 TABLET, FILM COATED, EXTENDED RELEASE ORAL EVERY 8 HOURS
Qty: 180 TABLET | Refills: 0 | Status: SHIPPED | OUTPATIENT
Start: 2024-01-01 | End: 2025-01-01

## 2024-01-01 RX ORDER — MORPHINE SULFATE 100 MG/5ML
20-40 SOLUTION ORAL
Qty: 240 ML | Refills: 0 | Status: SHIPPED | OUTPATIENT
Start: 2024-01-01 | End: 2025-01-01

## 2024-01-01 RX ADMIN — MORPHINE SULFATE 40 MG: 100 SOLUTION ORAL at 09:40

## 2024-01-01 RX ADMIN — MORPHINE SULFATE 40 MG: 100 SOLUTION ORAL at 08:43

## 2024-01-01 RX ADMIN — LORAZEPAM 2 MG: 2 CONCENTRATE ORAL at 10:27

## 2024-01-01 RX ADMIN — MORPHINE SULFATE 40 MG: 100 SOLUTION ORAL at 10:05

## 2024-01-01 RX ADMIN — LORAZEPAM 2 MG: 2 CONCENTRATE ORAL at 08:45

## 2024-01-01 ASSESSMENT — SOCIAL DETERMINANTS OF HEALTH (SDOH)
ACTIVE STRESSOR - HEALTH CHANGES: 1
ACTIVE STRESSOR - HEALTH CHANGES: 1
ACTIVE STRESSOR - EXPRESSED EMOTIONAL NEED: 1

## 2024-01-01 ASSESSMENT — ENCOUNTER SYMPTOMS
LOWEST PAIN SEVERITY IN PAST 24 HOURS: 9/10
FORGETFULNESS: 1
FATIGUES EASILY: 1
LAST BOWEL MOVEMENT: 66837
SLEEP QUALITY: ADEQUATE
PAIN: 1
STOOL FREQUENCY: LESS THAN DAILY
HIGHEST PAIN SEVERITY IN PAST 24 HOURS: 10/10
FATIGUE: 1
PAIN: 1
PERSON REPORTING PAIN: DIRECT OBSERVATION
PERSON REPORTING PAIN: DIRECT OBSERVATION
PAIN SEVERITY GOAL: 2/10
DYSPNEA ON EXERTION: 1
PAIN: 1
SLEEP QUALITY: ADEQUATE
PERSON REPORTING PAIN: PATIENT
PERSON REPORTING PAIN: DIRECT OBSERVATION
PAIN: 1

## 2024-01-01 ASSESSMENT — PAIN SCALES - PAIN ASSESSMENT IN ADVANCED DEMENTIA (PAINAD)
TOTALSCORE: 2
BODYLANGUAGE: 0 - RELAXED.
CONSOLABILITY: 1 - DISTRACTED OR REASSURED BY VOICE OR TOUCH.
FACIALEXPRESSION: 0 - SMILING OR INEXPRESSIVE.
CONSOLABILITY: 1 - DISTRACTED OR REASSURED BY VOICE OR TOUCH.
BODYLANGUAGE: 0 - RELAXED.
NEGVOCALIZATION: 1 - OCCASIONAL MOAN OR GROAN. LOW-LEVEL SPEECH WITH A NEGATIVE OR DISAPPROVING QUALITY.
FACIALEXPRESSION: 1 - SAD. FRIGHTENED. FROWN.
TOTALSCORE: 5
NEGVOCALIZATION: 0 - NONE.
NEGVOCALIZATION: 0 - NONE.
TOTALSCORE: 3
FACIALEXPRESSION: 1 - SAD. FRIGHTENED. FROWN.
CONSOLABILITY: 1 - DISTRACTED OR REASSURED BY VOICE OR TOUCH.
BODYLANGUAGE: 1 - TENSE. DISTRESSED PACING. FIDGETING.

## 2024-01-01 ASSESSMENT — ACTIVITIES OF DAILY LIVING (ADL)
AMBULATION ASSISTANCE: NON-AMBULATORY
CURRENT_FUNCTION: TWO PERSON